# Patient Record
Sex: MALE | Race: OTHER | Employment: FULL TIME | URBAN - METROPOLITAN AREA
[De-identification: names, ages, dates, MRNs, and addresses within clinical notes are randomized per-mention and may not be internally consistent; named-entity substitution may affect disease eponyms.]

---

## 2017-10-14 ENCOUNTER — LAB ENCOUNTER (OUTPATIENT)
Dept: LAB | Facility: HOSPITAL | Age: 42
End: 2017-10-14
Payer: COMMERCIAL

## 2017-10-14 DIAGNOSIS — Z00.00 ROUTINE GENERAL MEDICAL EXAMINATION AT A HEALTH CARE FACILITY: Primary | ICD-10-CM

## 2017-10-14 PROCEDURE — 85025 COMPLETE CBC W/AUTO DIFF WBC: CPT

## 2017-10-14 PROCEDURE — 36415 COLL VENOUS BLD VENIPUNCTURE: CPT

## 2017-10-14 PROCEDURE — 87491 CHLMYD TRACH DNA AMP PROBE: CPT

## 2017-10-14 PROCEDURE — 80061 LIPID PANEL: CPT

## 2017-10-14 PROCEDURE — 80053 COMPREHEN METABOLIC PANEL: CPT

## 2017-10-14 PROCEDURE — 87591 N.GONORRHOEAE DNA AMP PROB: CPT

## 2017-10-14 PROCEDURE — 83036 HEMOGLOBIN GLYCOSYLATED A1C: CPT

## 2017-10-14 PROCEDURE — 84443 ASSAY THYROID STIM HORMONE: CPT

## 2018-02-02 ENCOUNTER — OFFICE VISIT (OUTPATIENT)
Dept: FAMILY MEDICINE CLINIC | Facility: CLINIC | Age: 43
End: 2018-02-02

## 2018-02-02 VITALS
SYSTOLIC BLOOD PRESSURE: 146 MMHG | HEART RATE: 76 BPM | HEIGHT: 66 IN | BODY MASS INDEX: 25.55 KG/M2 | DIASTOLIC BLOOD PRESSURE: 88 MMHG | WEIGHT: 159 LBS

## 2018-02-02 DIAGNOSIS — R06.00 DOE (DYSPNEA ON EXERTION): ICD-10-CM

## 2018-02-02 DIAGNOSIS — E11.9 DIABETES MELLITUS TYPE 2 IN NONOBESE (HCC): Primary | ICD-10-CM

## 2018-02-02 PROBLEM — R06.09 DOE (DYSPNEA ON EXERTION): Status: ACTIVE | Noted: 2018-02-02

## 2018-02-02 PROCEDURE — 90471 IMMUNIZATION ADMIN: CPT | Performed by: FAMILY MEDICINE

## 2018-02-02 PROCEDURE — 90472 IMMUNIZATION ADMIN EACH ADD: CPT | Performed by: FAMILY MEDICINE

## 2018-02-02 PROCEDURE — 90715 TDAP VACCINE 7 YRS/> IM: CPT | Performed by: FAMILY MEDICINE

## 2018-02-02 PROCEDURE — 99212 OFFICE O/P EST SF 10 MIN: CPT | Performed by: FAMILY MEDICINE

## 2018-02-02 PROCEDURE — 90670 PCV13 VACCINE IM: CPT | Performed by: FAMILY MEDICINE

## 2018-02-02 PROCEDURE — 99203 OFFICE O/P NEW LOW 30 MIN: CPT | Performed by: FAMILY MEDICINE

## 2018-02-02 RX ORDER — ATORVASTATIN CALCIUM 40 MG/1
40 TABLET, FILM COATED ORAL NIGHTLY
Qty: 30 TABLET | Refills: 4 | Status: SHIPPED | OUTPATIENT
Start: 2018-02-02 | End: 2019-10-10

## 2018-02-02 RX ORDER — LISINOPRIL 10 MG/1
10 TABLET ORAL DAILY
Qty: 30 TABLET | Refills: 3 | Status: SHIPPED | OUTPATIENT
Start: 2018-02-02 | End: 2018-08-13

## 2018-02-02 NOTE — PROGRESS NOTES
Blood pressure 146/88, pulse 76, height 5' 6\" (1.676 m), weight 159 lb (72.1 kg). Patient presents today for initial visit. Diagnosed with diabetes 4 months ago. No one ever talked him about cholesterol.   He reports he did have high cholesterol previ

## 2018-02-02 NOTE — PATIENT INSTRUCTIONS
En qué consiste la diabetes tipo 2  Cuando james organismo funciona de [place] normal, los alimentos que usted come se digieren y se utilizan jean-claude sagrario de energía para las células del cuerpo. Si usted tiene diabetes, la glucosa que se utiliza jean-claude sagrario d En la primera etapa de la diabetes tipo 2, las células no responden adecuadamente a la insulina. En consecuencia, la cantidad de glucosa que entra en las células es inferior a la normal. [place] se conoce jean-claude resistencia a la insulina.  En reacción a esto, el Para el cuidado de james diabetes, quizás usted acuda a james proveedor de atención médica primaria o a un especialista entre 2 y 4 veces al [facility]. Esta página contiene algunos de los exámenes y pruebas de rutina que se recomiendan para las personas con diabetes. · Exámenes de los ojos. Usted puede tener problemas en los ojos aunque no tenga dificultades con la visión.  Un oftalmólogo (especialista en ojos) o un optómetra especialmente capacitado le hará un examen de los ojos con la pupila dilatada, jean-claude mínimo Ernestene Crown

## 2018-02-14 ENCOUNTER — NURSE TRIAGE (OUTPATIENT)
Dept: OTHER | Age: 43
End: 2018-02-14

## 2018-02-14 NOTE — TELEPHONE ENCOUNTER
Informed pt of [facility]'s response that appt is needed. Pt only wants to see [facility] (refuses appt with other provider) but since no openings left today with [facility] pt requested appt for Friday but then could not make available appt with [facility].  I strongly advised sooner

## 2018-02-14 NOTE — TELEPHONE ENCOUNTER
Action Requested: Summary for Provider     []  Critical Lab, Recommendations Needed  [] Need Additional Advice  []   FYI    []   Need Orders  [x] Need Medications Sent to Pharmacy  []  Other     SUMMARY: bilateral eyes itchy, red, with a small amount of di

## 2018-02-16 ENCOUNTER — TELEPHONE (OUTPATIENT)
Dept: FAMILY MEDICINE CLINIC | Facility: CLINIC | Age: 43
End: 2018-02-16

## 2018-02-17 ENCOUNTER — LAB ENCOUNTER (OUTPATIENT)
Dept: LAB | Facility: HOSPITAL | Age: 43
End: 2018-02-17
Attending: FAMILY MEDICINE
Payer: COMMERCIAL

## 2018-02-17 DIAGNOSIS — E11.9 DIABETES MELLITUS (HCC): Primary | ICD-10-CM

## 2018-02-17 DIAGNOSIS — E11.9 DIABETES MELLITUS TYPE 2 IN NONOBESE (HCC): ICD-10-CM

## 2018-02-17 LAB
ALT SERPL-CCNC: 39 U/L (ref 17–63)
ANION GAP SERPL CALC-SCNC: 8 MMOL/L (ref 0–18)
AST SERPL-CCNC: 31 U/L (ref 15–41)
BACTERIA UR QL AUTO: NEGATIVE /HPF
BILIRUB UR QL: NEGATIVE
BUN SERPL-MCNC: 14 MG/DL (ref 8–20)
BUN/CREAT SERPL: 19.2 (ref 10–20)
CALCIUM SERPL-MCNC: 9.1 MG/DL (ref 8.5–10.5)
CHLORIDE SERPL-SCNC: 102 MMOL/L (ref 95–110)
CHOLEST SERPL-MCNC: 147 MG/DL (ref 110–200)
CLARITY UR: CLEAR
CO2 SERPL-SCNC: 29 MMOL/L (ref 22–32)
COLOR UR: YELLOW
CREAT SERPL-MCNC: 0.73 MG/DL (ref 0.5–1.5)
CREAT UR-MCNC: 198.4 MG/DL
GLUCOSE SERPL-MCNC: 163 MG/DL (ref 70–99)
GLUCOSE UR-MCNC: >=500 MG/DL
HDLC SERPL-MCNC: 31 MG/DL
HGB UR QL STRIP.AUTO: NEGATIVE
LDLC SERPL CALC-MCNC: 90 MG/DL (ref 0–99)
LEUKOCYTE ESTERASE UR QL STRIP.AUTO: NEGATIVE
MICROALBUMIN UR-MCNC: 0.6 MG/DL (ref 0–1.8)
MICROALBUMIN/CREAT UR: 3 MG/G{CREAT} (ref 0–20)
NITRITE UR QL STRIP.AUTO: NEGATIVE
NONHDLC SERPL-MCNC: 116 MG/DL
OSMOLALITY UR CALC.SUM OF ELEC: 292 MOSM/KG (ref 275–295)
PH UR: 6 [PH] (ref 5–8)
POTASSIUM SERPL-SCNC: 4.2 MMOL/L (ref 3.3–5.1)
PROT UR-MCNC: NEGATIVE MG/DL
RBC #/AREA URNS AUTO: 2 /HPF
SODIUM SERPL-SCNC: 139 MMOL/L (ref 136–144)
SP GR UR STRIP: 1.03 (ref 1–1.03)
TRIGL SERPL-MCNC: 131 MG/DL (ref 1–149)
UROBILINOGEN UR STRIP-ACNC: <2
VIT C UR-MCNC: NEGATIVE MG/DL
WBC #/AREA URNS AUTO: 1 /HPF

## 2018-02-17 PROCEDURE — 84460 ALANINE AMINO (ALT) (SGPT): CPT

## 2018-02-17 PROCEDURE — 82570 ASSAY OF URINE CREATININE: CPT

## 2018-02-17 PROCEDURE — 82043 UR ALBUMIN QUANTITATIVE: CPT

## 2018-02-17 PROCEDURE — 81001 URINALYSIS AUTO W/SCOPE: CPT

## 2018-02-17 PROCEDURE — 93005 ELECTROCARDIOGRAM TRACING: CPT

## 2018-02-17 PROCEDURE — 84450 TRANSFERASE (AST) (SGOT): CPT

## 2018-02-17 PROCEDURE — 80061 LIPID PANEL: CPT

## 2018-02-17 PROCEDURE — 80048 BASIC METABOLIC PNL TOTAL CA: CPT

## 2018-02-17 PROCEDURE — 36415 COLL VENOUS BLD VENIPUNCTURE: CPT

## 2018-02-17 PROCEDURE — 93010 ELECTROCARDIOGRAM REPORT: CPT | Performed by: FAMILY MEDICINE

## 2018-02-17 NOTE — TELEPHONE ENCOUNTER
LMTCB-Please transferred to ext [phone]. From: Jimmy Tavera  Sent: 2/16/2018   2:45 PM  To: Darrius Victoria, DO  Subject: Authorziation                                    Prior authorization obtained for stress echo.

## 2018-02-23 ENCOUNTER — OFFICE VISIT (OUTPATIENT)
Dept: FAMILY MEDICINE CLINIC | Facility: CLINIC | Age: 43
End: 2018-02-23

## 2018-02-23 VITALS
WEIGHT: 161.5 LBS | BODY MASS INDEX: 25.96 KG/M2 | HEART RATE: 78 BPM | HEIGHT: 66 IN | SYSTOLIC BLOOD PRESSURE: 120 MMHG | DIASTOLIC BLOOD PRESSURE: 76 MMHG

## 2018-02-23 DIAGNOSIS — Z00.00 ROUTINE PHYSICAL EXAMINATION: Primary | ICD-10-CM

## 2018-02-23 DIAGNOSIS — E11.9 DIABETES MELLITUS TYPE 2 IN NONOBESE (HCC): ICD-10-CM

## 2018-02-23 DIAGNOSIS — F41.0 PANIC ANXIETY SYNDROME: ICD-10-CM

## 2018-02-23 PROCEDURE — 99396 PREV VISIT EST AGE 40-64: CPT | Performed by: FAMILY MEDICINE

## 2018-02-23 RX ORDER — ESCITALOPRAM OXALATE 10 MG/1
10 TABLET ORAL DAILY
Qty: 30 TABLET | Refills: 2 | Status: SHIPPED | OUTPATIENT
Start: 2018-02-23 | End: 2019-08-27

## 2018-08-14 RX ORDER — LISINOPRIL 10 MG/1
TABLET ORAL
Qty: 90 TABLET | Refills: 3 | Status: SHIPPED | OUTPATIENT
Start: 2018-08-14 | End: 2019-08-21

## 2019-01-23 ENCOUNTER — TELEPHONE (OUTPATIENT)
Dept: FAMILY MEDICINE CLINIC | Facility: CLINIC | Age: 44
End: 2019-01-23

## 2019-01-23 DIAGNOSIS — E66.9 DIABETES MELLITUS TYPE 2 IN OBESE: Primary | ICD-10-CM

## 2019-01-23 DIAGNOSIS — E11.69 DIABETES MELLITUS TYPE 2 IN OBESE: Primary | ICD-10-CM

## 2019-01-23 NOTE — TELEPHONE ENCOUNTER
Pt would like to know if he can be seen this Saturday for his diabetic check it is hard for him to get off work during the week?

## 2019-01-26 ENCOUNTER — OFFICE VISIT (OUTPATIENT)
Dept: FAMILY MEDICINE CLINIC | Facility: CLINIC | Age: 44
End: 2019-01-26
Payer: COMMERCIAL

## 2019-01-26 ENCOUNTER — APPOINTMENT (OUTPATIENT)
Dept: LAB | Age: 44
End: 2019-01-26
Attending: FAMILY MEDICINE
Payer: COMMERCIAL

## 2019-01-26 ENCOUNTER — HOSPITAL ENCOUNTER (OUTPATIENT)
Dept: GENERAL RADIOLOGY | Age: 44
Discharge: HOME | End: 2019-01-26
Attending: FAMILY MEDICINE
Payer: COMMERCIAL

## 2019-01-26 VITALS
HEART RATE: 75 BPM | DIASTOLIC BLOOD PRESSURE: 77 MMHG | SYSTOLIC BLOOD PRESSURE: 124 MMHG | WEIGHT: 156 LBS | BODY MASS INDEX: 25.07 KG/M2 | HEIGHT: 66 IN

## 2019-01-26 DIAGNOSIS — F41.0 PANIC ANXIETY SYNDROME: ICD-10-CM

## 2019-01-26 DIAGNOSIS — M25.512 CHRONIC LEFT SHOULDER PAIN: ICD-10-CM

## 2019-01-26 DIAGNOSIS — G89.29 CHRONIC LEFT SHOULDER PAIN: ICD-10-CM

## 2019-01-26 DIAGNOSIS — E78.00 PURE HYPERCHOLESTEROLEMIA: ICD-10-CM

## 2019-01-26 DIAGNOSIS — E66.9 DIABETES MELLITUS TYPE 2 IN OBESE (HCC): ICD-10-CM

## 2019-01-26 DIAGNOSIS — E11.9 DIABETES MELLITUS TYPE 2 IN NONOBESE (HCC): Primary | ICD-10-CM

## 2019-01-26 DIAGNOSIS — E11.9 DIABETES MELLITUS TYPE 2 IN NONOBESE (HCC): ICD-10-CM

## 2019-01-26 DIAGNOSIS — E11.69 DIABETES MELLITUS TYPE 2 IN OBESE (HCC): ICD-10-CM

## 2019-01-26 LAB
ALT SERPL-CCNC: 55 U/L (ref 17–63)
ANION GAP SERPL CALC-SCNC: 11 MMOL/L (ref 0–18)
AST SERPL-CCNC: 38 U/L (ref 15–41)
BACTERIA UR QL AUTO: NEGATIVE /HPF
BILIRUB UR QL: NEGATIVE
BUN SERPL-MCNC: 13 MG/DL (ref 8–20)
BUN/CREAT SERPL: 20.6 (ref 10–20)
CALCIUM SERPL-MCNC: 9.5 MG/DL (ref 8.5–10.5)
CHLORIDE SERPL-SCNC: 99 MMOL/L (ref 95–110)
CHOLEST SERPL-MCNC: 198 MG/DL (ref 110–200)
CLARITY UR: CLEAR
CO2 SERPL-SCNC: 26 MMOL/L (ref 22–32)
COLOR UR: YELLOW
COMPLEXED PSA SERPL-MCNC: 0.33 NG/ML (ref 0.01–4)
CREAT SERPL-MCNC: 0.63 MG/DL (ref 0.5–1.5)
CREAT UR-MCNC: 121.9 MG/DL
EST. AVERAGE GLUCOSE BLD GHB EST-MCNC: 258 MG/DL (ref 68–126)
GLUCOSE SERPL-MCNC: 188 MG/DL (ref 70–99)
GLUCOSE UR-MCNC: >=500 MG/DL
HBA1C MFR BLD HPLC: 10.6 % (ref ?–5.7)
HDLC SERPL-MCNC: 43 MG/DL
HGB UR QL STRIP.AUTO: NEGATIVE
KETONES UR-MCNC: 20 MG/DL
LDLC SERPL CALC-MCNC: 134 MG/DL (ref 0–99)
LEUKOCYTE ESTERASE UR QL STRIP.AUTO: NEGATIVE
MICROALBUMIN UR-MCNC: 0.7 MG/DL (ref 0–1.8)
MICROALBUMIN/CREAT UR: 5.7 MG/G{CREAT} (ref 0–20)
NITRITE UR QL STRIP.AUTO: NEGATIVE
NONHDLC SERPL-MCNC: 155 MG/DL
OSMOLALITY UR CALC.SUM OF ELEC: 287 MOSM/KG (ref 275–295)
PH UR: 5 [PH] (ref 5–8)
POTASSIUM SERPL-SCNC: 4.2 MMOL/L (ref 3.3–5.1)
PROT UR-MCNC: NEGATIVE MG/DL
PSA SERPL-MCNC: 0.3 NG/ML (ref 0–4)
RBC #/AREA URNS AUTO: <1 /HPF
SODIUM SERPL-SCNC: 136 MMOL/L (ref 136–144)
SP GR UR STRIP: 1.04 (ref 1–1.03)
TRIGL SERPL-MCNC: 105 MG/DL (ref 1–149)
TSH SERPL-ACNC: 0.84 UIU/ML (ref 0.45–5.33)
UROBILINOGEN UR STRIP-ACNC: <2
VIT C UR-MCNC: NEGATIVE MG/DL
WBC #/AREA URNS AUTO: <1 /HPF

## 2019-01-26 PROCEDURE — 84443 ASSAY THYROID STIM HORMONE: CPT

## 2019-01-26 PROCEDURE — 82570 ASSAY OF URINE CREATININE: CPT

## 2019-01-26 PROCEDURE — 82043 UR ALBUMIN QUANTITATIVE: CPT

## 2019-01-26 PROCEDURE — 84460 ALANINE AMINO (ALT) (SGPT): CPT

## 2019-01-26 PROCEDURE — 80061 LIPID PANEL: CPT

## 2019-01-26 PROCEDURE — 73030 X-RAY EXAM OF SHOULDER: CPT | Performed by: FAMILY MEDICINE

## 2019-01-26 PROCEDURE — 99214 OFFICE O/P EST MOD 30 MIN: CPT | Performed by: FAMILY MEDICINE

## 2019-01-26 PROCEDURE — 36415 COLL VENOUS BLD VENIPUNCTURE: CPT

## 2019-01-26 PROCEDURE — 80048 BASIC METABOLIC PNL TOTAL CA: CPT

## 2019-01-26 PROCEDURE — 99212 OFFICE O/P EST SF 10 MIN: CPT | Performed by: FAMILY MEDICINE

## 2019-01-26 PROCEDURE — 83036 HEMOGLOBIN GLYCOSYLATED A1C: CPT

## 2019-01-26 PROCEDURE — 81001 URINALYSIS AUTO W/SCOPE: CPT

## 2019-01-26 PROCEDURE — 84450 TRANSFERASE (AST) (SGOT): CPT

## 2019-01-26 NOTE — PROGRESS NOTES
Blood pressure 124/77, pulse 75, height 5' 6\" (1.676 m), weight 156 lb (70.8 kg). Following up for diabetes and hyperlipidemia. Patient has had diabetes for more than 5 years. He denies chest pain or dyspnea. No polyuria or polydipsia.   He did have

## 2019-03-19 ENCOUNTER — APPOINTMENT (OUTPATIENT)
Dept: ENDOCRINOLOGY | Facility: HOSPITAL | Age: 44
End: 2019-03-19
Attending: FAMILY MEDICINE
Payer: COMMERCIAL

## 2019-08-02 ENCOUNTER — HOSPITAL ENCOUNTER (OUTPATIENT)
Dept: ULTRASOUND IMAGING | Age: 44
Discharge: HOME | End: 2019-08-02
Attending: FAMILY MEDICINE

## 2019-08-02 DIAGNOSIS — Z13.6 SCREENING FOR CARDIOVASCULAR CONDITION: ICD-10-CM

## 2019-08-02 NOTE — PROGRESS NOTES
Pt seen at [facility] Stroke Screening tests:  PRELIMINARY results as follows:    Carotid US=Right ICA; normal                      Left ICA; normal    Abdominal Aorta US= normal     XY=420/84    Cholestec labs as follows:  HI=931  HDL=36

## 2019-08-06 ENCOUNTER — TELEPHONE (OUTPATIENT)
Dept: FAMILY MEDICINE CLINIC | Facility: CLINIC | Age: 44
End: 2019-08-06

## 2019-08-06 NOTE — TELEPHONE ENCOUNTER
----- Message from Charlotte Emery DO sent at 8/5/2019  1:36 PM CDT -----  Results show fatty liver otherwise normal.  Exercise and weight loss.

## 2019-08-06 NOTE — TELEPHONE ENCOUNTER
Patient called back via Language Line  ID #[id number] (Name and  of pt verified). All results and recommendations reviewed. Patient verbalizes understanding, denies further questions and agrees with plan of care.

## 2019-08-22 RX ORDER — LISINOPRIL 10 MG/1
TABLET ORAL
Qty: 90 TABLET | Refills: 1 | Status: SHIPPED | OUTPATIENT
Start: 2019-08-22 | End: 2019-10-10

## 2019-08-22 NOTE — TELEPHONE ENCOUNTER
Hypertensive Medications  Protocol Criteria:  · Appointment scheduled in the past 6 months or in the next 3 months  · BMP or CMP in the past 12 months  · Creatinine result < 2  Recent Outpatient Visits            6 months ago Diabetes mellitus type 2 in no

## 2019-08-27 ENCOUNTER — OFFICE VISIT (OUTPATIENT)
Dept: FAMILY MEDICINE CLINIC | Facility: CLINIC | Age: 44
End: 2019-08-27
Payer: COMMERCIAL

## 2019-08-27 VITALS
WEIGHT: 162 LBS | DIASTOLIC BLOOD PRESSURE: 66 MMHG | HEIGHT: 66 IN | BODY MASS INDEX: 26.03 KG/M2 | SYSTOLIC BLOOD PRESSURE: 116 MMHG | HEART RATE: 80 BPM

## 2019-08-27 DIAGNOSIS — I10 ESSENTIAL HYPERTENSION: ICD-10-CM

## 2019-08-27 DIAGNOSIS — E11.9 DIABETES MELLITUS TYPE 2 IN NONOBESE (HCC): Primary | ICD-10-CM

## 2019-08-27 DIAGNOSIS — E78.00 PURE HYPERCHOLESTEROLEMIA: ICD-10-CM

## 2019-08-27 PROCEDURE — 99213 OFFICE O/P EST LOW 20 MIN: CPT | Performed by: FAMILY MEDICINE

## 2019-08-27 RX ORDER — GLIMEPIRIDE 4 MG/1
4 TABLET ORAL
Qty: 90 TABLET | Refills: 1 | Status: SHIPPED | OUTPATIENT
Start: 2019-08-27 | End: 2019-10-10 | Stop reason: ALTCHOICE

## 2019-08-27 NOTE — PROGRESS NOTES
Blood pressure 116/66, pulse 80, height 5' 6\" (1.676 m), weight 162 lb (73.5 kg). Presents today following up for diabetes. Has had diabetes for 10 years. Also with hypertension and hyperlipidemia.   No longer takes medication for anxiety and depressi

## 2019-09-27 ENCOUNTER — OFFICE VISIT (OUTPATIENT)
Dept: FAMILY MEDICINE CLINIC | Facility: CLINIC | Age: 44
End: 2019-09-27
Payer: COMMERCIAL

## 2019-09-27 ENCOUNTER — HOSPITAL ENCOUNTER (OUTPATIENT)
Dept: GENERAL RADIOLOGY | Age: 44
Discharge: HOME | End: 2019-09-27
Attending: FAMILY MEDICINE
Payer: COMMERCIAL

## 2019-09-27 VITALS
HEIGHT: 66 IN | BODY MASS INDEX: 26.49 KG/M2 | WEIGHT: 164.81 LBS | HEART RATE: 64 BPM | TEMPERATURE: 98 F | DIASTOLIC BLOOD PRESSURE: 66 MMHG | SYSTOLIC BLOOD PRESSURE: 141 MMHG

## 2019-09-27 DIAGNOSIS — E11.9 DIABETES MELLITUS TYPE 2 IN NONOBESE (HCC): ICD-10-CM

## 2019-09-27 DIAGNOSIS — M79.645 THUMB PAIN, LEFT: ICD-10-CM

## 2019-09-27 DIAGNOSIS — M79.645 THUMB PAIN, LEFT: Primary | ICD-10-CM

## 2019-09-27 DIAGNOSIS — S62.501A CLOSED NONDISPLACED FRACTURE OF PHALANX OF RIGHT THUMB, UNSPECIFIED PHALANX, INITIAL ENCOUNTER: ICD-10-CM

## 2019-09-27 PROCEDURE — 99213 OFFICE O/P EST LOW 20 MIN: CPT | Performed by: FAMILY MEDICINE

## 2019-09-27 PROCEDURE — 73140 X-RAY EXAM OF FINGER(S): CPT | Performed by: FAMILY MEDICINE

## 2019-09-27 RX ORDER — HYDROCODONE BITARTRATE AND ACETAMINOPHEN 5; 325 MG/1; MG/1
TABLET ORAL
Qty: 30 TABLET | Refills: 0 | Status: SHIPPED | OUTPATIENT
Start: 2019-09-27 | End: 2019-10-10 | Stop reason: ALTCHOICE

## 2019-09-27 NOTE — PROGRESS NOTES
Blood pressure 141/66, pulse 64, temperature 97.7 °F (36.5 °C), temperature source Oral, height 5' 6\" (1.676 m), weight 164 lb 12.8 oz (74.8 kg). Patient presents today complaining of left thumb pain after eating with a hammer yesterday.   He did not se

## 2019-09-30 ENCOUNTER — APPOINTMENT (OUTPATIENT)
Dept: LAB | Age: 44
End: 2019-09-30
Attending: FAMILY MEDICINE
Payer: COMMERCIAL

## 2019-09-30 ENCOUNTER — OFFICE VISIT (OUTPATIENT)
Dept: SURGERY | Facility: CLINIC | Age: 44
End: 2019-09-30
Payer: COMMERCIAL

## 2019-09-30 DIAGNOSIS — S62.525A CLOSED NONDISPLACED FRACTURE OF DISTAL PHALANX OF LEFT THUMB, INITIAL ENCOUNTER: Primary | ICD-10-CM

## 2019-09-30 DIAGNOSIS — E11.9 DIABETES MELLITUS TYPE 2 IN NONOBESE (HCC): ICD-10-CM

## 2019-09-30 LAB
ALBUMIN SERPL-MCNC: 4.2 G/DL (ref 3.4–5)
ALBUMIN/GLOB SERPL: 1.2 {RATIO} (ref 1–2)
ALP LIVER SERPL-CCNC: 75 U/L (ref 45–117)
ALT SERPL-CCNC: 55 U/L (ref 16–61)
ANION GAP SERPL CALC-SCNC: 5 MMOL/L (ref 0–18)
AST SERPL-CCNC: 29 U/L (ref 15–37)
BILIRUB SERPL-MCNC: 0.4 MG/DL (ref 0.1–2)
BUN BLD-MCNC: 17 MG/DL (ref 7–18)
BUN/CREAT SERPL: 21 (ref 10–20)
CALCIUM BLD-MCNC: 9.2 MG/DL (ref 8.5–10.1)
CHLORIDE SERPL-SCNC: 106 MMOL/L (ref 98–112)
CO2 SERPL-SCNC: 28 MMOL/L (ref 21–32)
CREAT BLD-MCNC: 0.81 MG/DL (ref 0.7–1.3)
GLOBULIN PLAS-MCNC: 3.4 G/DL (ref 2.8–4.4)
GLUCOSE BLD-MCNC: 132 MG/DL (ref 70–99)
M PROTEIN MFR SERPL ELPH: 7.6 G/DL (ref 6.4–8.2)
OSMOLALITY SERPL CALC.SUM OF ELEC: 291 MOSM/KG (ref 275–295)
PATIENT FASTING: YES
POTASSIUM SERPL-SCNC: 4.7 MMOL/L (ref 3.5–5.1)
SODIUM SERPL-SCNC: 139 MMOL/L (ref 136–145)

## 2019-09-30 PROCEDURE — 36415 COLL VENOUS BLD VENIPUNCTURE: CPT | Performed by: FAMILY MEDICINE

## 2019-09-30 PROCEDURE — 99243 OFF/OP CNSLTJ NEW/EST LOW 30: CPT | Performed by: PLASTIC SURGERY

## 2019-09-30 PROCEDURE — 80061 LIPID PANEL: CPT

## 2019-09-30 PROCEDURE — 29130 APPL FINGER SPLINT STATIC: CPT | Performed by: OCCUPATIONAL THERAPIST

## 2019-09-30 PROCEDURE — 83036 HEMOGLOBIN GLYCOSYLATED A1C: CPT

## 2019-09-30 PROCEDURE — 80053 COMPREHEN METABOLIC PANEL: CPT | Performed by: FAMILY MEDICINE

## 2019-09-30 NOTE — H&P
Injury 1: Communited undisplaced fracture of tuft of distal phalanx of left thumb  - Date: 09/26/19  - Days Since: 4    Lacy Andre is a 53year old male that presents with Patient presents with:  Fracture: Distal Phalanx of left thumb  .     Dave Cain MG Oral Tab Take 1 tablet (4 mg total) by mouth every morning before breakfast. For diabetes Disp: 90 tablet Rfl: 1   LISINOPRIL 10 MG Oral Tab TAKE 1 TABLET(10 MG) BY MOUTH DAILY Disp: 90 tablet Rfl: 1   atorvastatin 40 MG Oral Tab Take 1 tablet (40 mg to Asked        Past Sunlamp Treatments for Acne: Not Asked        History of tanning: Not Asked        Hx of Spending [place] of Time in Sun: Not Asked        Bad sunburns in the past: Not Asked        Tanning Salons in the Past: Not Asked        Hx of Ra

## 2019-09-30 NOTE — PROGRESS NOTES
Subjective: I smashed my thumb in a machine. Objective:     Current level of performance:  ADL: Independent  Work: One handed work   Leisure: Exercise.     Measurements/Tests:  ROM:         N/A:          Treatment Provided this day: Fabricated a left t

## 2019-10-10 ENCOUNTER — OFFICE VISIT (OUTPATIENT)
Dept: FAMILY MEDICINE CLINIC | Facility: CLINIC | Age: 44
End: 2019-10-10
Payer: COMMERCIAL

## 2019-10-10 VITALS
SYSTOLIC BLOOD PRESSURE: 132 MMHG | HEIGHT: 66 IN | RESPIRATION RATE: 19 BRPM | DIASTOLIC BLOOD PRESSURE: 79 MMHG | HEART RATE: 66 BPM | WEIGHT: 160.38 LBS | BODY MASS INDEX: 25.78 KG/M2

## 2019-10-10 DIAGNOSIS — E11.9 TYPE 2 DIABETES MELLITUS WITHOUT COMPLICATION, WITHOUT LONG-TERM CURRENT USE OF INSULIN (HCC): Primary | ICD-10-CM

## 2019-10-10 DIAGNOSIS — E11.9 DIABETES MELLITUS WITHOUT COMPLICATION (HCC): ICD-10-CM

## 2019-10-10 DIAGNOSIS — E78.2 MIXED HYPERLIPIDEMIA: ICD-10-CM

## 2019-10-10 PROCEDURE — 90686 IIV4 VACC NO PRSV 0.5 ML IM: CPT | Performed by: PHYSICIAN ASSISTANT

## 2019-10-10 PROCEDURE — 90471 IMMUNIZATION ADMIN: CPT | Performed by: PHYSICIAN ASSISTANT

## 2019-10-10 PROCEDURE — 99214 OFFICE O/P EST MOD 30 MIN: CPT | Performed by: PHYSICIAN ASSISTANT

## 2019-10-10 RX ORDER — ATORVASTATIN CALCIUM 40 MG/1
20 TABLET, FILM COATED ORAL NIGHTLY
Qty: 90 TABLET | Refills: 3 | Status: SHIPPED | OUTPATIENT
Start: 2019-10-10 | End: 2020-06-12

## 2019-10-10 RX ORDER — ATORVASTATIN CALCIUM 40 MG/1
40 TABLET, FILM COATED ORAL NIGHTLY
Qty: 90 TABLET | Refills: 1 | Status: CANCELLED | OUTPATIENT
Start: 2019-10-10

## 2019-10-10 RX ORDER — LISINOPRIL 10 MG/1
TABLET ORAL
Qty: 90 TABLET | Refills: 3 | Status: SHIPPED | OUTPATIENT
Start: 2019-10-10 | End: 2020-06-12

## 2019-10-10 RX ORDER — GLIMEPIRIDE 4 MG/1
4 TABLET ORAL
Qty: 90 TABLET | Refills: 1 | Status: CANCELLED | OUTPATIENT
Start: 2019-10-10

## 2019-10-10 NOTE — PROGRESS NOTES
HPI:   Diabetes   He presents for his initial diabetic visit. He has type 2 diabetes mellitus. The initial diagnosis of diabetes was made 8 years ago. His disease course has been fluctuating.  Pertinent negatives for hypoglycemia include no dizziness or hea pertinent surgical history. Family History:   History reviewed. No pertinent family history.     Social History:   Social History    Socioeconomic History      Marital status: Single      Spouse name: Not on file      Number of children: Not on file Not on file      Review of Systems:   Review of Systems   Constitutional: Negative for activity change, chills, fatigue and fever. HENT: Negative for congestion, ear discharge, ear pain, postnasal drip, rhinorrhea, sinus pressure and sore throat.     Ey Advise patient to take medication as directed. Patient verbalized understanding.            Relevant Medications    atorvastatin 40 MG Oral Tab       Endocrine    Type 2 diabetes mellitus without complication, without long-term current use of insulin ([address] plan of care with pt and pt is in agreement. All questions answered. Pt to call with questions or concerns. Encouraged to sign up for My Chart if not already registered.

## 2019-10-11 PROBLEM — Z00.00 ROUTINE PHYSICAL EXAMINATION: Status: RESOLVED | Noted: 2018-02-23 | Resolved: 2019-10-11

## 2019-10-11 PROBLEM — E11.9 TYPE 2 DIABETES MELLITUS WITHOUT COMPLICATION, WITHOUT LONG-TERM CURRENT USE OF INSULIN (HCC): Status: ACTIVE | Noted: 2019-10-11

## 2019-10-11 PROBLEM — E78.2 MIXED HYPERLIPIDEMIA: Status: ACTIVE | Noted: 2019-10-11

## 2019-10-11 NOTE — ASSESSMENT & PLAN NOTE
Discussed lab results with patient. Continue with Metformin 1000 mg PO BID. Discontinue Glimepiride 4 mg PO QAM. Start Jardiance 10 mg PO QAM. Instruct patient to monitor BG pre and post meals. Goals  1. Preprandial glucose targets  mg/dL.   2. Systo

## 2019-10-11 NOTE — ASSESSMENT & PLAN NOTE
Resume Lipitor 20 mg PO QHS. Advise patient to take medication as directed. Patient verbalized understanding.

## 2019-10-14 ENCOUNTER — OFFICE VISIT (OUTPATIENT)
Dept: SURGERY | Facility: CLINIC | Age: 44
End: 2019-10-14
Payer: COMMERCIAL

## 2019-10-14 DIAGNOSIS — M62.81 DISTAL MUSCLE WEAKNESS: ICD-10-CM

## 2019-10-14 DIAGNOSIS — M25.642 STIFFNESS OF JOINT, HAND, LEFT: Primary | ICD-10-CM

## 2019-10-14 DIAGNOSIS — S62.525A CLOSED NONDISPLACED FRACTURE OF DISTAL PHALANX OF LEFT THUMB, INITIAL ENCOUNTER: Primary | ICD-10-CM

## 2019-10-14 PROCEDURE — 97110 THERAPEUTIC EXERCISES: CPT | Performed by: OCCUPATIONAL THERAPIST

## 2019-10-14 PROCEDURE — 99212 OFFICE O/P EST SF 10 MIN: CPT | Performed by: PLASTIC SURGERY

## 2019-10-14 PROCEDURE — 29130 APPL FINGER SPLINT STATIC: CPT | Performed by: OCCUPATIONAL THERAPIST

## 2019-10-14 PROCEDURE — 97166 OT EVAL MOD COMPLEX 45 MIN: CPT | Performed by: OCCUPATIONAL THERAPIST

## 2019-10-14 NOTE — PROGRESS NOTES
OCCUPATIONAL THERAPY EVALUATION:   Xu Lobo   [id number]       SUBJECTIVE:    HX of Injury: I hurt my left thumb at work. Chief Complaint:   Minimal discomfort. .    Precautions: # 2 pound lifting restriction with the left hand.   Premorbid Functi .    Increased PIP AROM  X 15 - 20 degrees. .    Long term goals to be reached in x 3-4 weeks:    1) Full functional use of the involved extremity for self-care, leisure and work related tasks:  .         Patient will be seen 2 x /week for 3 weeks or a tot

## 2019-10-14 NOTE — PROGRESS NOTES
Injury 1: L TH DP comminuted,nondisplaced fracture  - Date: 09/26/19  - Days Since: 18  Arrived with no splint, states \"took it off after shower this am but otherwise had been wearing it all the time\"  Intermittent dull pain with activity and to touch di

## 2019-10-16 ENCOUNTER — TELEPHONE (OUTPATIENT)
Dept: SURGERY | Facility: CLINIC | Age: 44
End: 2019-10-16

## 2019-10-16 NOTE — TELEPHONE ENCOUNTER
Spoke to [employer], workman's compensation  for ptAdela Real wanted recent work status form to be faxed over to [phone] and all notes from Dr. Millicent Cárdenas.    Addie informed release of information will help obtain all records for notes of Dr. Malena Flores

## 2019-10-24 ENCOUNTER — OFFICE VISIT (OUTPATIENT)
Dept: SURGERY | Facility: CLINIC | Age: 44
End: 2019-10-24
Payer: COMMERCIAL

## 2019-10-24 DIAGNOSIS — M62.81 DISTAL MUSCLE WEAKNESS: ICD-10-CM

## 2019-10-24 DIAGNOSIS — M25.642 STIFFNESS OF JOINT, HAND, LEFT: Primary | ICD-10-CM

## 2019-10-24 PROCEDURE — 97110 THERAPEUTIC EXERCISES: CPT | Performed by: OCCUPATIONAL THERAPIST

## 2019-10-24 NOTE — PROGRESS NOTES
Subjective:  My thumb does not hurt      Objective:     Current level of performance:  ADL: Independent  Work: Restricted 2 # lifting limitation with the left hand.   Leisure: Not addressed    Measurements/Tests:  ROM:            N/A      Treatment Provided

## 2019-10-31 ENCOUNTER — OFFICE VISIT (OUTPATIENT)
Dept: SURGERY | Facility: CLINIC | Age: 44
End: 2019-10-31
Payer: COMMERCIAL

## 2019-10-31 DIAGNOSIS — M62.81 DISTAL MUSCLE WEAKNESS: ICD-10-CM

## 2019-10-31 DIAGNOSIS — S62.525A CLOSED NONDISPLACED FRACTURE OF DISTAL PHALANX OF LEFT THUMB, INITIAL ENCOUNTER: Primary | ICD-10-CM

## 2019-10-31 DIAGNOSIS — M25.642 STIFFNESS OF JOINT, HAND, LEFT: Primary | ICD-10-CM

## 2019-10-31 PROCEDURE — 97110 THERAPEUTIC EXERCISES: CPT | Performed by: OCCUPATIONAL THERAPIST

## 2019-10-31 PROCEDURE — 99212 OFFICE O/P EST SF 10 MIN: CPT | Performed by: PLASTIC SURGERY

## 2019-10-31 NOTE — PROGRESS NOTES
Subjective:  I am ready to work. Objective:     Current level of performance:  ADL: Independent  Work: Returning to full duty.   Leisure: Not addressed    Measurements/Tests:  ROM:  Testing By: mary   Strength Right: 105 #      Strength Left: 10

## 2019-10-31 NOTE — PROGRESS NOTES
Injury 1: L TH DP comminuted,nondisplaced fracture  - Date: 09/26/19  - Days Since: 35      Pt here for FU of L TH DP fx   States he's doing \"good\"   C/o \"pulsing\" pain when pressure is applied to IP L TH   Denies numbness and tingling   L TH nail plat

## 2019-11-10 NOTE — TELEPHONE ENCOUNTER
Duplicate request, previously addressed.     Requested Prescriptions   Pending Prescriptions Disp Refills   • METFORMIN  MG Oral Tab [Pharmacy Med Name: METFORMIN 500MG TABLETS] 180 tablet 0     Sig: TAKE 1 TABLET(500 MG) BY MOUTH TWICE DAILY WITH ME

## 2020-05-15 ENCOUNTER — NURSE TRIAGE (OUTPATIENT)
Dept: FAMILY MEDICINE CLINIC | Facility: CLINIC | Age: 45
End: 2020-05-15

## 2020-05-15 NOTE — PROGRESS NOTES
Virtual Telephone Check-In    Giuseppe Banks verbally consents to a Virtual/Telephone Check-In visit on 05/15/20. Patient understands and accepts financial responsibility for any deductible, co-insurance and/or co-pays associated with this service.

## 2020-05-15 NOTE — TELEPHONE ENCOUNTER
Action Requested: Summary for Provider     []  Critical Lab, Recommendations Needed  [] Need Additional Advice  []   FYI    []   Need Orders  [] Need Medications Sent to Pharmacy  []  Other     SUMMARY: pt lives with brother in law who tested positive on M

## 2020-06-11 NOTE — TELEPHONE ENCOUNTER
Pt would like a refill on his metformin and lisinopril medication. Per pt he is out of medication.  Pharmacy: [facility] (listed)     Current Outpatient Medications   Medication Sig Dispense Refill   • lisinopril 10 MG Oral Tab TAKE 1 TABLET(10 MG)

## 2020-06-12 RX ORDER — LISINOPRIL 10 MG/1
TABLET ORAL
Qty: 30 TABLET | Refills: 0 | Status: SHIPPED | OUTPATIENT
Start: 2020-06-12 | End: 2021-01-30

## 2020-06-12 RX ORDER — ATORVASTATIN CALCIUM 40 MG/1
20 TABLET, FILM COATED ORAL NIGHTLY
Qty: 30 TABLET | Refills: 0 | Status: SHIPPED | OUTPATIENT
Start: 2020-06-12 | End: 2021-01-30

## 2020-06-12 NOTE — TELEPHONE ENCOUNTER
Min pt has made a appt to see you on June 24 would you be able to refill his medications? Pt is going out of town today. I have pended the medications for your review and approval. The Stevan Dave was in his history list but pt stated that he was taking it bu

## 2020-06-12 NOTE — TELEPHONE ENCOUNTER
Spoke with patient, provided lab phone number, he will call back and schedule OV, Please schedule a 30 min diabetic f/u with Beau OLMOS in [facility], thank you

## 2020-11-04 DIAGNOSIS — E11.69 DIABETES MELLITUS TYPE 2 IN OBESE (HCC): Primary | ICD-10-CM

## 2020-11-04 DIAGNOSIS — E66.9 DIABETES MELLITUS TYPE 2 IN OBESE (HCC): Primary | ICD-10-CM

## 2020-11-04 RX ORDER — EMPAGLIFLOZIN 10 MG/1
1 TABLET, FILM COATED ORAL DAILY
Qty: 90 TABLET | Refills: 0 | Status: SHIPPED | OUTPATIENT
Start: 2020-11-04 | End: 2021-01-30

## 2020-11-11 NOTE — TELEPHONE ENCOUNTER
Patient will call to schedule an appointment for blood work and call back to schedule an appointment with Dr. Sebastian Gonzalez. Patient also mentioned he was diagnosed covid positive about 15 days. Patient was tested outside of [place] due to exposure.  Pj

## 2021-01-30 ENCOUNTER — OFFICE VISIT (OUTPATIENT)
Dept: FAMILY MEDICINE CLINIC | Facility: CLINIC | Age: 46
End: 2021-01-30
Payer: COMMERCIAL

## 2021-01-30 VITALS
WEIGHT: 150 LBS | HEART RATE: 73 BPM | SYSTOLIC BLOOD PRESSURE: 145 MMHG | BODY MASS INDEX: 24.11 KG/M2 | HEIGHT: 66 IN | DIASTOLIC BLOOD PRESSURE: 87 MMHG

## 2021-01-30 DIAGNOSIS — Z00.00 ROUTINE GENERAL MEDICAL EXAMINATION AT A HEALTH CARE FACILITY: Primary | ICD-10-CM

## 2021-01-30 DIAGNOSIS — E11.9 TYPE 2 DIABETES MELLITUS WITHOUT COMPLICATION, WITHOUT LONG-TERM CURRENT USE OF INSULIN (HCC): ICD-10-CM

## 2021-01-30 DIAGNOSIS — E78.2 MIXED HYPERLIPIDEMIA: ICD-10-CM

## 2021-01-30 DIAGNOSIS — F41.0 PANIC ANXIETY SYNDROME: ICD-10-CM

## 2021-01-30 DIAGNOSIS — E55.9 VITAMIN D DEFICIENCY: ICD-10-CM

## 2021-01-30 DIAGNOSIS — Z12.5 SCREENING FOR MALIGNANT NEOPLASM OF PROSTATE: ICD-10-CM

## 2021-01-30 DIAGNOSIS — N48.1 BALANITIS: ICD-10-CM

## 2021-01-30 DIAGNOSIS — I10 ESSENTIAL HYPERTENSION: ICD-10-CM

## 2021-01-30 PROCEDURE — 90686 IIV4 VACC NO PRSV 0.5 ML IM: CPT | Performed by: PHYSICIAN ASSISTANT

## 2021-01-30 PROCEDURE — 90471 IMMUNIZATION ADMIN: CPT | Performed by: PHYSICIAN ASSISTANT

## 2021-01-30 PROCEDURE — 99396 PREV VISIT EST AGE 40-64: CPT | Performed by: PHYSICIAN ASSISTANT

## 2021-01-30 PROCEDURE — 3008F BODY MASS INDEX DOCD: CPT | Performed by: PHYSICIAN ASSISTANT

## 2021-01-30 PROCEDURE — 3079F DIAST BP 80-89 MM HG: CPT | Performed by: PHYSICIAN ASSISTANT

## 2021-01-30 PROCEDURE — 90732 PPSV23 VACC 2 YRS+ SUBQ/IM: CPT | Performed by: PHYSICIAN ASSISTANT

## 2021-01-30 PROCEDURE — 90472 IMMUNIZATION ADMIN EACH ADD: CPT | Performed by: PHYSICIAN ASSISTANT

## 2021-01-30 PROCEDURE — 3077F SYST BP >= 140 MM HG: CPT | Performed by: PHYSICIAN ASSISTANT

## 2021-01-30 RX ORDER — ATORVASTATIN CALCIUM 20 MG/1
20 TABLET, FILM COATED ORAL NIGHTLY
Qty: 90 TABLET | Refills: 1 | Status: SHIPPED | OUTPATIENT
Start: 2021-01-30 | End: 2021-02-13 | Stop reason: DRUGHIGH

## 2021-01-30 RX ORDER — LISINOPRIL 10 MG/1
TABLET ORAL
Qty: 90 TABLET | Refills: 1 | Status: SHIPPED | OUTPATIENT
Start: 2021-01-30 | End: 2021-07-30

## 2021-01-30 RX ORDER — FLUCONAZOLE 150 MG/1
TABLET ORAL
Qty: 2 TABLET | Refills: 0 | Status: SHIPPED | OUTPATIENT
Start: 2021-01-30 | End: 2021-02-13 | Stop reason: ALTCHOICE

## 2021-01-30 RX ORDER — ESCITALOPRAM OXALATE 10 MG/1
10 TABLET ORAL DAILY
Qty: 90 TABLET | Refills: 1 | Status: SHIPPED | OUTPATIENT
Start: 2021-01-30 | End: 2021-08-17 | Stop reason: ALTCHOICE

## 2021-01-30 RX ORDER — CLOTRIMAZOLE 1 %
1 CREAM (GRAM) TOPICAL 2 TIMES DAILY
Qty: 28 G | Refills: 0 | Status: SHIPPED | OUTPATIENT
Start: 2021-01-30 | End: 2021-02-13 | Stop reason: ALTCHOICE

## 2021-01-30 RX ORDER — EMPAGLIFLOZIN 10 MG/1
1 TABLET, FILM COATED ORAL DAILY
Qty: 90 TABLET | Refills: 1 | Status: SHIPPED | OUTPATIENT
Start: 2021-01-30 | End: 2021-02-02

## 2021-01-30 NOTE — PATIENT INSTRUCTIONS
Pautas de prevención para hombres de 36 a 52 años de [address] [facility] de detección y las vacunas son importantes para el manejo de james esther.  La consejería sobre james esther también es esencial. A continuación, verá pautas en relación con esos temas para ho Sífilis Los hombres con mayor riesgo de infección; hable con james proveedor de [employer] médica En los exámenes de rutina   Tuberculosis Los hombres con mayor riesgo de infección; hable con james proveedor de atención médica Consulte a james proveedor de atención m PCV13: rolando dosis [facility] 19 y 72 años de edad (protege contra 13 tipos de bacteria neumocócica)    PPSV23: Kenan Keep a dos dosis [employer] 59 años de John, o rolando dosis a partir de los 65 años (protege contra 23 tipos de bacteria neumocócica)   Refuerzo de téta

## 2021-01-30 NOTE — PROGRESS NOTES
HPI:   Nena Mccabe is a 50year old male who presents for an Annual Health Visit. Patient complaints of penile redness and itching and whitish discharge since he takes Jardiance.    Patient denies of chest pain, SOB, N/V/C/D, fever, dizziness, /87   Pulse 73   Ht 5' 6\" (1.676 m)   Wt 150 lb (68 kg)   BMI 24.21 kg/m²    Wt Readings from Last 6 Encounters:  01/30/21 : 150 lb (68 kg)  10/10/19 : 160 lb 6.4 oz (72.8 kg)  09/27/19 : 164 lb 12.8 oz (74.8 kg)  08/27/19 : 162 lb (73.5 kg)  01/26/ -     metFORMIN HCl 1000 MG Oral Tab; TAKE ONE TABLET BY MOUTH 2 TIMES DAILY WIT MEALS  -     Empagliflozin (JARDIANCE) 10 MG Oral Tab; Take 1 tablet by mouth daily. -     fluconazole (DIFLUCAN) 150 MG Oral Tab;  Take 1 tablet PO today, then repeat in 3 da Diabetes tipo 2 o prediabetes [employer] a la edad de Dam[place], y los adultos que no tengan síntomas a ninguna edad, que tengan sobrepeso o que carrie obesos y que tengan 1 o más factores de riesgo para la diabetes Al menos cada 3 años (anual Hepatitis B Los hombres con mayor riesgo de infección; hable con james proveedor de [employer] dosis a lo bubba de seis meses; la segunda dosis debería aplicarse un mes después de la primera dosis; la tercera dosis debería aplicarse al [place] Uso diario de [facility] [place] 39 y 78 años de edad con riesgo de tener problemas cardiovasculares En los exámenes de rutina   Uso del tabaco y los efectos que puede tener sobre la esther [employer] hombres de supriya nura de [employer] Luci Chapa

## 2021-01-31 DIAGNOSIS — I10 ESSENTIAL HYPERTENSION: ICD-10-CM

## 2021-01-31 DIAGNOSIS — E11.9 TYPE 2 DIABETES MELLITUS WITHOUT COMPLICATION, WITHOUT LONG-TERM CURRENT USE OF INSULIN (HCC): ICD-10-CM

## 2021-02-01 RX ORDER — LISINOPRIL 10 MG/1
TABLET ORAL
Qty: 90 TABLET | Refills: 1 | OUTPATIENT
Start: 2021-02-01

## 2021-02-01 RX ORDER — ATORVASTATIN CALCIUM 40 MG/1
TABLET, FILM COATED ORAL
Qty: 90 TABLET | Refills: 3 | OUTPATIENT
Start: 2021-02-01

## 2021-02-01 NOTE — TELEPHONE ENCOUNTER
Current Outpatient Medications   Medication Sig Dispense Refill   • Empagliflozin (JARDIANCE) 10 MG Oral Tab Take 1 tablet by mouth daily.  90 tablet 1

## 2021-02-02 RX ORDER — EMPAGLIFLOZIN 10 MG/1
1 TABLET, FILM COATED ORAL DAILY
Qty: 90 TABLET | Refills: 1 | Status: SHIPPED | OUTPATIENT
Start: 2021-02-02 | End: 2021-02-13 | Stop reason: DRUGHIGH

## 2021-02-13 ENCOUNTER — OFFICE VISIT (OUTPATIENT)
Dept: FAMILY MEDICINE CLINIC | Facility: CLINIC | Age: 46
End: 2021-02-13
Payer: COMMERCIAL

## 2021-02-13 VITALS
SYSTOLIC BLOOD PRESSURE: 134 MMHG | HEART RATE: 74 BPM | DIASTOLIC BLOOD PRESSURE: 82 MMHG | BODY MASS INDEX: 24.27 KG/M2 | WEIGHT: 151 LBS | HEIGHT: 66 IN

## 2021-02-13 DIAGNOSIS — I10 ESSENTIAL HYPERTENSION: ICD-10-CM

## 2021-02-13 DIAGNOSIS — E11.9 TYPE 2 DIABETES MELLITUS WITHOUT COMPLICATION, WITHOUT LONG-TERM CURRENT USE OF INSULIN (HCC): ICD-10-CM

## 2021-02-13 DIAGNOSIS — E78.2 MIXED HYPERLIPIDEMIA: Primary | ICD-10-CM

## 2021-02-13 PROCEDURE — 99213 OFFICE O/P EST LOW 20 MIN: CPT | Performed by: PHYSICIAN ASSISTANT

## 2021-02-13 PROCEDURE — 3008F BODY MASS INDEX DOCD: CPT | Performed by: PHYSICIAN ASSISTANT

## 2021-02-13 PROCEDURE — 3079F DIAST BP 80-89 MM HG: CPT | Performed by: PHYSICIAN ASSISTANT

## 2021-02-13 PROCEDURE — 3075F SYST BP GE 130 - 139MM HG: CPT | Performed by: PHYSICIAN ASSISTANT

## 2021-02-13 RX ORDER — ERGOCALCIFEROL 1.25 MG/1
50000 CAPSULE ORAL
Qty: 12 CAPSULE | Refills: 3 | Status: SHIPPED | OUTPATIENT
Start: 2021-02-13 | End: 2021-05-14

## 2021-02-13 RX ORDER — ATORVASTATIN CALCIUM 40 MG/1
40 TABLET, FILM COATED ORAL NIGHTLY
Qty: 90 TABLET | Refills: 3 | Status: SHIPPED | OUTPATIENT
Start: 2021-02-13 | End: 2021-05-14

## 2021-02-13 RX ORDER — EMPAGLIFLOZIN 25 MG/1
25 TABLET, FILM COATED ORAL EVERY MORNING
Qty: 90 TABLET | Refills: 1 | Status: SHIPPED | OUTPATIENT
Start: 2021-02-13 | End: 2021-03-15

## 2021-02-13 NOTE — PROGRESS NOTES
HPI:   HPI  50year-old male is here in the office for lab results. Patient is doing fine at this time. Patient monitors BG 2 times per week. Patient denies of chest pain, SOB, N/V/C/D, fever, dizziness, syncope, abdominal pain.  There are no other concern Medical: Not on file        Non-medical: Not on file    Tobacco Use      Smoking status: Never Smoker      Smokeless tobacco: Never Used    Substance and Sexual Activity      Alcohol use: No      Drug use: No      Sexual activity: Not on file    Lifestyle Negative for rash. Neurological: Negative for dizziness, weakness, numbness and headaches. 02/13/21  0833   BP: 134/82   Pulse: 74   Weight: 151 lb (68.5 kg)   Height: 5' 6\" (1.676 m)     Body mass index is 24.37 kg/m².     Physical Exam:   Physic plan of care with pt and pt is in agreement. All questions answered. Pt to call with questions or concerns.

## 2021-03-29 ENCOUNTER — TELEPHONE (OUTPATIENT)
Dept: FAMILY MEDICINE CLINIC | Facility: CLINIC | Age: 46
End: 2021-03-29

## 2021-07-30 DIAGNOSIS — E11.9 TYPE 2 DIABETES MELLITUS WITHOUT COMPLICATION, WITHOUT LONG-TERM CURRENT USE OF INSULIN (HCC): ICD-10-CM

## 2021-07-30 DIAGNOSIS — I10 ESSENTIAL HYPERTENSION: ICD-10-CM

## 2021-07-30 RX ORDER — LISINOPRIL 10 MG/1
TABLET ORAL
Qty: 30 TABLET | Refills: 0 | Status: SHIPPED | OUTPATIENT
Start: 2021-07-30 | End: 2021-08-17

## 2021-08-17 ENCOUNTER — TELEPHONE (OUTPATIENT)
Dept: FAMILY MEDICINE CLINIC | Facility: CLINIC | Age: 46
End: 2021-08-17

## 2021-08-17 ENCOUNTER — OFFICE VISIT (OUTPATIENT)
Dept: FAMILY MEDICINE CLINIC | Facility: CLINIC | Age: 46
End: 2021-08-17
Payer: COMMERCIAL

## 2021-08-17 VITALS
SYSTOLIC BLOOD PRESSURE: 133 MMHG | HEIGHT: 66 IN | HEART RATE: 89 BPM | DIASTOLIC BLOOD PRESSURE: 74 MMHG | WEIGHT: 149 LBS | BODY MASS INDEX: 23.95 KG/M2

## 2021-08-17 DIAGNOSIS — I10 ESSENTIAL HYPERTENSION: ICD-10-CM

## 2021-08-17 DIAGNOSIS — E11.9 TYPE 2 DIABETES MELLITUS WITHOUT COMPLICATION, WITHOUT LONG-TERM CURRENT USE OF INSULIN (HCC): ICD-10-CM

## 2021-08-17 DIAGNOSIS — E78.2 MIXED HYPERLIPIDEMIA: Primary | ICD-10-CM

## 2021-08-17 PROCEDURE — 3008F BODY MASS INDEX DOCD: CPT | Performed by: PHYSICIAN ASSISTANT

## 2021-08-17 PROCEDURE — 3078F DIAST BP <80 MM HG: CPT | Performed by: PHYSICIAN ASSISTANT

## 2021-08-17 PROCEDURE — 99213 OFFICE O/P EST LOW 20 MIN: CPT | Performed by: PHYSICIAN ASSISTANT

## 2021-08-17 PROCEDURE — 3075F SYST BP GE 130 - 139MM HG: CPT | Performed by: PHYSICIAN ASSISTANT

## 2021-08-17 RX ORDER — ATORVASTATIN CALCIUM 40 MG/1
TABLET, FILM COATED ORAL
COMMUNITY
Start: 2021-07-05 | End: 2021-08-17

## 2021-08-17 RX ORDER — EMPAGLIFLOZIN 25 MG/1
1 TABLET, FILM COATED ORAL EVERY MORNING
COMMUNITY
Start: 2021-07-31 | End: 2021-08-17

## 2021-08-17 RX ORDER — LISINOPRIL 10 MG/1
10 TABLET ORAL DAILY
Qty: 90 TABLET | Refills: 1 | Status: SHIPPED | OUTPATIENT
Start: 2021-08-17 | End: 2021-11-15

## 2021-08-17 RX ORDER — EMPAGLIFLOZIN 25 MG/1
25 TABLET, FILM COATED ORAL EVERY MORNING
Qty: 90 TABLET | Refills: 1 | Status: SHIPPED | OUTPATIENT
Start: 2021-08-17 | End: 2021-11-15

## 2021-08-17 RX ORDER — ATORVASTATIN CALCIUM 40 MG/1
40 TABLET, FILM COATED ORAL NIGHTLY
Qty: 90 TABLET | Refills: 1 | Status: SHIPPED | OUTPATIENT
Start: 2021-08-17 | End: 2021-11-15

## 2021-08-17 NOTE — TELEPHONE ENCOUNTER
Received lab test results from [facility]. Hemoglobin A1c 8.9. LDL cholesterol 128. Patient needs appointment to be seen this week or next week.

## 2021-08-19 NOTE — PROGRESS NOTES
HPI:   HPI  50year-old male is here in the office for follow up type 2 DM. Patient is doing fine at this time. Patient denies of chest pain, SOB, N/V/C/D, fever, dizziness, syncope, abdominal pain. There are no other concerns today.     Medications:     C Activity      Alcohol use: No      Drug use: No      Sexual activity: Not on file    Other Topics      Concerns:        Left Handed: Not Asked        Right Handed: Yes        Currently spends a great deal of time in the sun: Not Asked        Past Sunlamp T abdominal pain, blood in stool, constipation, diarrhea, nausea and vomiting. Skin: Negative for rash. 08/17/21  1828   BP: 133/74   Pulse: 89   Weight: 149 lb (67.6 kg)   Height: 5' 6\" (1.676 m)     Body mass index is 24.05 kg/m².     Physical Ex Detail Level: Zone with Metformin 1000 mg PO BID and Jardiance 25 mg PO QAM. Start Januvia 100 mg PO every day. Instruct patient to monitor BG pre and post meals. Discussed plan of care with pt and pt is in agreement. All questions answered.  Pt to call with questions or co Detail Level: Generalized

## 2022-02-25 DIAGNOSIS — E11.9 TYPE 2 DIABETES MELLITUS WITHOUT COMPLICATION, WITHOUT LONG-TERM CURRENT USE OF INSULIN (HCC): ICD-10-CM

## 2022-02-25 DIAGNOSIS — I10 ESSENTIAL HYPERTENSION: ICD-10-CM

## 2022-02-25 RX ORDER — EMPAGLIFLOZIN 25 MG/1
TABLET, FILM COATED ORAL
Qty: 90 TABLET | Refills: 1 | OUTPATIENT
Start: 2022-02-25

## 2022-02-25 RX ORDER — LISINOPRIL 10 MG/1
TABLET ORAL
Qty: 90 TABLET | Refills: 1 | OUTPATIENT
Start: 2022-02-25

## 2022-02-25 NOTE — TELEPHONE ENCOUNTER
Please review.  Protocol Failed or has no Protocol  Requested Prescriptions   Pending Prescriptions Disp Refills    LISINOPRIL 10 MG Oral Tab [Pharmacy Med Name: LISINOPRIL 10MG TABLETS] 90 tablet 1     Sig: TAKE 1 TABLET(10 MG) BY MOUTH DAILY        Hypertensive Medications Protocol Failed - 2/25/2022  3:35 PM        Failed - CMP or BMP in past 12 months        Failed - Appointment in past 6 or next 3 months        Passed - GFR Non- > 50     Lab Results   Component Value Date    GFRNAA 107 09/30/2019                    JARDIANCE 25 MG Oral Tab [Pharmacy Med Name: Dasie Hilts 25MG TABLETS] 90 tablet 1     Sig: TAKE 1 TABLET BY MOUTH EVERY MORNING        Diabetes Medication Protocol Failed - 2/25/2022  5:45 AM        Failed - Last A1C < 7.5 and within past 6 months     Lab Results   Component Value Date    A1C 8.4 (H) 09/30/2019               Failed - Appointment in past 6 or next 3 months        Failed - GFR in the past 12 months        Passed - GFR Non- > 50     Lab Results   Component Value Date    GFRNAA 107 09/30/2019                    METFORMIN HCL 1000 MG Oral Tab [Pharmacy Med Name: METFORMIN 1000MG TABLETS] 180 tablet 1     Sig: TAKE 1 TABLET(1000 MG) BY MOUTH TWICE DAILY WITH MEALS        Diabetes Medication Protocol Failed - 2/25/2022  5:45 AM        Failed - Last A1C < 7.5 and within past 6 months     Lab Results   Component Value Date    A1C 8.4 (H) 09/30/2019               Failed - Appointment in past 6 or next 3 months        Failed - GFR in the past 12 months        Passed - GFR Non- > 50     Lab Results   Component Value Date    GFRNAA 107 09/30/2019                       Recent Outpatient Visits              6 months ago Mixed hyperlipidemia    [facility] Karina Goyal PA-C    Office Visit    1 year ago Mixed hyperlipidemia    [facility] Karina Goyal PA-C    Office Visit    1 year ago Routine general medical examination at a health care facility    [address] [facility] Shaun Baires PA-C    Office Visit    1 year ago Panic anxiety syndrome    [address] [facility] Jessie Matamoros DO    Virtual Phone E/M    2 years ago Closed nondisplaced fracture of distal phalanx of left thumb, initial encounter    [address] [place] [address],[address], Yoselin Andersen MD    Office Visit

## 2022-03-11 DIAGNOSIS — E11.9 TYPE 2 DIABETES MELLITUS WITHOUT COMPLICATION, WITHOUT LONG-TERM CURRENT USE OF INSULIN (HCC): ICD-10-CM

## 2022-03-12 RX ORDER — EMPAGLIFLOZIN 25 MG/1
TABLET, FILM COATED ORAL
Qty: 90 TABLET | Refills: 1 | OUTPATIENT
Start: 2022-03-12

## 2022-03-12 NOTE — TELEPHONE ENCOUNTER
Please review refill protocol failed/ no protocol  Requested Prescriptions   Pending Prescriptions Disp Refills    METFORMIN HCL 1000 MG Oral Tab [Pharmacy Med Name: METFORMIN 1000MG TABLETS] 180 tablet 1     Sig: TAKE 1 TABLET(1000 MG) BY MOUTH TWICE DAILY WITH MEALS        Diabetes Medication Protocol Failed - 3/11/2022  5:50 PM        Failed - Last A1C < 7.5 and within past 6 months     Lab Results   Component Value Date    A1C 8.4 (H) 09/30/2019               Failed - Appointment in past 6 or next 3 months        Failed - GFR in the past 12 months        Passed - GFR Non- > 50     Lab Results   Component Value Date    GFRNAA 107 09/30/2019                    JARDIANCE 25 MG Oral Tab [Pharmacy Med Name: Alexandria Tristan 25MG TABLETS] 90 tablet 1     Sig: TAKE 1 TABLET BY MOUTH EVERY MORNING        Diabetes Medication Protocol Failed - 3/11/2022  5:50 PM        Failed - Last A1C < 7.5 and within past 6 months     Lab Results   Component Value Date    A1C 8.4 (H) 09/30/2019               Failed - Appointment in past 6 or next 3 months        Failed - GFR in the past 12 months        Passed - GFR Non- > 50     Lab Results   Component Value Date    GFRNAA 107 09/30/2019

## 2022-03-23 DIAGNOSIS — E11.9 TYPE 2 DIABETES MELLITUS WITHOUT COMPLICATION, WITHOUT LONG-TERM CURRENT USE OF INSULIN (HCC): ICD-10-CM

## 2022-03-24 NOTE — TELEPHONE ENCOUNTER
Please review; protocol failed/no protocol.   Requested Prescriptions   Pending Prescriptions Disp Refills    METFORMIN HCL 1000 MG Oral Tab [Pharmacy Med Name: METFORMIN 1000MG TABLETS] 180 tablet 1     Sig: TAKE 1 TABLET(1000 MG) BY MOUTH TWICE DAILY WITH MEALS        Diabetes Medication Protocol Failed - 3/23/2022  8:34 PM        Failed - Last A1C < 7.5 and within past 6 months     Lab Results   Component Value Date    A1C 8.4 (H) 09/30/2019               Failed - Appointment in past 6 or next 3 months        Failed - GFR in the past 12 months        Passed - GFR Non- > 50     Lab Results   Component Value Date    GFRNAA 107 09/30/2019                     Recent Outpatient Visits              7 months ago Mixed hyperlipidemia    [address] [facility], [address] [facility], [place]    Office Visit    1 year ago Mixed hyperlipidemia    [address] [facility] Jeffry Huggins PA-C    Office Visit    1 year ago Routine general medical examination at a health care facility    [address] [facility] Jeffry Huggins PA-C    Office Visit    1 year ago Panic anxiety syndrome    [facility], [address],     Virtual Phone E/M    2 years ago Closed nondisplaced fracture of distal phalanx of left thumb, initial encounter    [address] [facility],[address], [address] [facility],[address], Juliana Hirsch MD    Office Visit

## 2022-04-05 DIAGNOSIS — E11.9 TYPE 2 DIABETES MELLITUS WITHOUT COMPLICATION, WITHOUT LONG-TERM CURRENT USE OF INSULIN (HCC): ICD-10-CM

## 2022-04-26 ENCOUNTER — TELEPHONE (OUTPATIENT)
Dept: FAMILY MEDICINE CLINIC | Facility: CLINIC | Age: 47
End: 2022-04-26

## 2022-04-26 RX ORDER — EMPAGLIFLOZIN 25 MG/1
1 TABLET, FILM COATED ORAL EVERY MORNING
Qty: 90 TABLET | Refills: 0 | Status: SHIPPED | OUTPATIENT
Start: 2022-04-26 | End: 2022-07-19

## 2022-05-16 ENCOUNTER — OFFICE VISIT (OUTPATIENT)
Dept: FAMILY MEDICINE CLINIC | Facility: CLINIC | Age: 47
End: 2022-05-16
Payer: COMMERCIAL

## 2022-05-16 VITALS
HEART RATE: 94 BPM | SYSTOLIC BLOOD PRESSURE: 137 MMHG | HEIGHT: 66 IN | WEIGHT: 149 LBS | BODY MASS INDEX: 23.95 KG/M2 | DIASTOLIC BLOOD PRESSURE: 84 MMHG

## 2022-05-16 DIAGNOSIS — I10 ESSENTIAL HYPERTENSION: ICD-10-CM

## 2022-05-16 DIAGNOSIS — E78.2 MIXED HYPERLIPIDEMIA: ICD-10-CM

## 2022-05-16 DIAGNOSIS — E11.9 TYPE 2 DIABETES MELLITUS WITHOUT COMPLICATION, WITHOUT LONG-TERM CURRENT USE OF INSULIN (HCC): Primary | ICD-10-CM

## 2022-05-16 PROCEDURE — 3079F DIAST BP 80-89 MM HG: CPT | Performed by: FAMILY MEDICINE

## 2022-05-16 PROCEDURE — 3075F SYST BP GE 130 - 139MM HG: CPT | Performed by: FAMILY MEDICINE

## 2022-05-16 PROCEDURE — 3008F BODY MASS INDEX DOCD: CPT | Performed by: FAMILY MEDICINE

## 2022-05-16 PROCEDURE — 99214 OFFICE O/P EST MOD 30 MIN: CPT | Performed by: FAMILY MEDICINE

## 2022-05-16 RX ORDER — ROSUVASTATIN CALCIUM 40 MG/1
40 TABLET, COATED ORAL NIGHTLY
Qty: 90 TABLET | Refills: 1 | Status: SHIPPED | OUTPATIENT
Start: 2022-05-16

## 2022-05-16 RX ORDER — LISINOPRIL 10 MG/1
TABLET ORAL
COMMUNITY
Start: 2022-01-29

## 2022-05-16 RX ORDER — ATORVASTATIN CALCIUM 40 MG/1
TABLET, FILM COATED ORAL
COMMUNITY
Start: 2022-02-25 | End: 2022-05-16

## 2022-05-16 NOTE — PROGRESS NOTES
Blood pressure 137/84, pulse 94, height 5' 6\" (1.676 m), weight 149 lb (67.6 kg). Patient presents today following up for type 2 diabetes. Denies chest pain, dyspnea or hypoglycemia. Has not been very compliant with his cholesterol medication. Has been exercising more lately. Denies anxiety at this time. Objective feet with good pulses and intact skin    LDL cholesterol 200    Glycohemoglobin level 8.0    Assessment #1 type 2 diabetes uncontrolled #2 hyperlipidemia uncontrolled #3 hypertension controlled #4 anxiety in remission    Plan #1 add Januvia patient tolerated well previously diabetic eye exam orders given and diabetic education information given #2 discontinue atorvastatin start rosuvastatin #3 continue lisinopril #4 no medications at this time    Follow-up in 4 months for physical fasting blood test prior.

## 2022-07-19 RX ORDER — EMPAGLIFLOZIN 25 MG/1
1 TABLET, FILM COATED ORAL EVERY MORNING
Qty: 90 TABLET | Refills: 1 | Status: SHIPPED | OUTPATIENT
Start: 2022-07-19 | End: 2022-11-25

## 2022-07-30 ENCOUNTER — APPOINTMENT (OUTPATIENT)
Dept: GENERAL RADIOLOGY | Age: 47
End: 2022-07-30
Attending: EMERGENCY MEDICINE
Payer: OTHER MISCELLANEOUS

## 2022-07-30 ENCOUNTER — HOSPITAL ENCOUNTER (OUTPATIENT)
Age: 47
Discharge: HOME | End: 2022-07-30
Attending: EMERGENCY MEDICINE
Payer: OTHER MISCELLANEOUS

## 2022-07-30 VITALS
SYSTOLIC BLOOD PRESSURE: 138 MMHG | RESPIRATION RATE: 20 BRPM | OXYGEN SATURATION: 97 % | TEMPERATURE: 98 F | DIASTOLIC BLOOD PRESSURE: 70 MMHG | HEART RATE: 83 BPM

## 2022-07-30 DIAGNOSIS — S60.10XA SUBUNGUAL HEMATOMA OF FINGERNAIL, INITIAL ENCOUNTER: Primary | ICD-10-CM

## 2022-07-30 PROCEDURE — 99213 OFFICE O/P EST LOW 20 MIN: CPT

## 2022-07-30 PROCEDURE — 11740 EVACUATION SUBUNGUAL HMTMA: CPT

## 2022-07-30 PROCEDURE — 99203 OFFICE O/P NEW LOW 30 MIN: CPT

## 2022-07-30 PROCEDURE — 73140 X-RAY EXAM OF FINGER(S): CPT | Performed by: EMERGENCY MEDICINE

## 2022-07-30 RX ORDER — IBUPROFEN 600 MG/1
600 TABLET ORAL ONCE
Status: COMPLETED | OUTPATIENT
Start: 2022-07-30 | End: 2022-07-30

## 2022-07-30 NOTE — ED INITIAL ASSESSMENT (HPI)
Patient with crush injury to left thumb. States 2 metal tubes crushed his left thumb while at work yesterday.  + bruising noted to the nailbed.

## 2022-11-22 ENCOUNTER — TELEPHONE (OUTPATIENT)
Dept: FAMILY MEDICINE CLINIC | Facility: CLINIC | Age: 47
End: 2022-11-22

## 2022-11-25 ENCOUNTER — OFFICE VISIT (OUTPATIENT)
Dept: FAMILY MEDICINE CLINIC | Facility: CLINIC | Age: 47
End: 2022-11-25
Payer: COMMERCIAL

## 2022-11-25 VITALS
SYSTOLIC BLOOD PRESSURE: 133 MMHG | BODY MASS INDEX: 23.14 KG/M2 | DIASTOLIC BLOOD PRESSURE: 76 MMHG | HEIGHT: 66 IN | HEART RATE: 86 BPM | WEIGHT: 144 LBS

## 2022-11-25 DIAGNOSIS — E78.2 MIXED HYPERLIPIDEMIA: ICD-10-CM

## 2022-11-25 DIAGNOSIS — E11.9 DIABETES MELLITUS TYPE 2 IN NONOBESE (HCC): Primary | ICD-10-CM

## 2022-11-25 DIAGNOSIS — Z12.11 ENCOUNTER FOR SCREENING COLONOSCOPY: ICD-10-CM

## 2022-11-25 DIAGNOSIS — I10 ESSENTIAL HYPERTENSION: ICD-10-CM

## 2022-11-25 PROCEDURE — 90686 IIV4 VACC NO PRSV 0.5 ML IM: CPT | Performed by: FAMILY MEDICINE

## 2022-11-25 PROCEDURE — 3078F DIAST BP <80 MM HG: CPT | Performed by: FAMILY MEDICINE

## 2022-11-25 PROCEDURE — 3075F SYST BP GE 130 - 139MM HG: CPT | Performed by: FAMILY MEDICINE

## 2022-11-25 PROCEDURE — 3008F BODY MASS INDEX DOCD: CPT | Performed by: FAMILY MEDICINE

## 2022-11-25 PROCEDURE — 99213 OFFICE O/P EST LOW 20 MIN: CPT | Performed by: FAMILY MEDICINE

## 2022-11-25 PROCEDURE — 90471 IMMUNIZATION ADMIN: CPT | Performed by: FAMILY MEDICINE

## 2022-11-25 RX ORDER — LISINOPRIL 10 MG/1
10 TABLET ORAL DAILY
Qty: 90 TABLET | Refills: 1 | Status: SHIPPED | OUTPATIENT
Start: 2022-11-25

## 2022-11-25 RX ORDER — EMPAGLIFLOZIN 25 MG/1
1 TABLET, FILM COATED ORAL EVERY MORNING
Qty: 90 TABLET | Refills: 1 | Status: SHIPPED | OUTPATIENT
Start: 2022-11-25

## 2022-11-25 RX ORDER — ROSUVASTATIN CALCIUM 40 MG/1
40 TABLET, COATED ORAL NIGHTLY
Qty: 90 TABLET | Refills: 1 | Status: SHIPPED | OUTPATIENT
Start: 2022-11-25

## 2022-11-25 RX ORDER — ATORVASTATIN CALCIUM 40 MG/1
TABLET, FILM COATED ORAL
COMMUNITY
Start: 2022-09-23 | End: 2022-11-25

## 2022-11-25 NOTE — PROGRESS NOTES
Blood pressure 133/76, pulse 86, height 5' 6\" (1.676 m), weight 144 lb (65.3 kg). Following up for type 2 diabetes denies chest pain or dyspnea no hypoglycemia. Losing weight deliberately.     Objective feet with good pulses and intact skin    Assessment type 2 diabetes hyperlipidemia hypertension    Plan we will await scanning of blood test results also encourage compliance for eye exam and gastroenterology colonoscopy    [facility] imaging order printed for coronary calcium score    Will follow with results

## 2023-03-03 ENCOUNTER — TELEPHONE (OUTPATIENT)
Dept: FAMILY MEDICINE CLINIC | Facility: CLINIC | Age: 48
End: 2023-03-03

## 2023-06-20 RX ORDER — EMPAGLIFLOZIN 25 MG/1
1 TABLET, FILM COATED ORAL EVERY MORNING
Qty: 90 TABLET | Refills: 0 | OUTPATIENT
Start: 2023-06-20

## 2023-06-20 NOTE — TELEPHONE ENCOUNTER
Duplicate request, previously addressed. Too soon     Disp Refills Start End    Empagliflozin (JARDIANCE) 25 MG Oral Tab 90 tablet 0 6/1/2023     Sig - Route:  Take 1 tablet by mouth every morning. - Oral    Sent to pharmacy as: Jardiance 25 MG Oral Tablet (Empagliflozin)    E-Prescribing Status: Receipt confirmed by pharmacy (6/1/2023  3:06 PM CDT)

## 2023-06-22 ENCOUNTER — TELEPHONE (OUTPATIENT)
Dept: FAMILY MEDICINE CLINIC | Facility: CLINIC | Age: 48
End: 2023-06-22

## 2023-06-23 ENCOUNTER — MED REC SCAN ONLY (OUTPATIENT)
Dept: FAMILY MEDICINE CLINIC | Facility: CLINIC | Age: 48
End: 2023-06-23

## 2023-06-24 ENCOUNTER — LAB ENCOUNTER (OUTPATIENT)
Dept: LAB | Age: 48
End: 2023-06-24
Attending: FAMILY MEDICINE
Payer: COMMERCIAL

## 2023-06-24 ENCOUNTER — OFFICE VISIT (OUTPATIENT)
Dept: FAMILY MEDICINE CLINIC | Facility: CLINIC | Age: 48
End: 2023-06-24

## 2023-06-24 VITALS
HEIGHT: 66 IN | BODY MASS INDEX: 23.46 KG/M2 | SYSTOLIC BLOOD PRESSURE: 113 MMHG | HEART RATE: 93 BPM | WEIGHT: 146 LBS | DIASTOLIC BLOOD PRESSURE: 69 MMHG

## 2023-06-24 DIAGNOSIS — E11.9 DIABETES MELLITUS TYPE 2 IN NONOBESE (HCC): ICD-10-CM

## 2023-06-24 DIAGNOSIS — Z12.11 ENCOUNTER FOR SCREENING COLONOSCOPY: ICD-10-CM

## 2023-06-24 DIAGNOSIS — Z12.11 ENCOUNTER FOR SCREENING COLONOSCOPY: Primary | ICD-10-CM

## 2023-06-24 LAB
CREAT UR-SCNC: 33.5 MG/DL
MICROALBUMIN UR-MCNC: <0.5 MG/DL

## 2023-06-24 PROCEDURE — 82570 ASSAY OF URINE CREATININE: CPT

## 2023-06-24 PROCEDURE — 82043 UR ALBUMIN QUANTITATIVE: CPT

## 2023-06-24 PROCEDURE — 3061F NEG MICROALBUMINURIA REV: CPT | Performed by: PHYSICIAN ASSISTANT

## 2023-06-24 RX ORDER — LINAGLIPTIN 5 MG/1
5 TABLET, FILM COATED ORAL DAILY
COMMUNITY
Start: 2023-06-09

## 2023-06-24 RX ORDER — ROSUVASTATIN CALCIUM 20 MG/1
20 TABLET, COATED ORAL NIGHTLY
Qty: 90 TABLET | Refills: 0 | COMMUNITY
Start: 2023-06-24

## 2023-06-24 RX ORDER — TRAZODONE HYDROCHLORIDE 50 MG/1
50 TABLET ORAL NIGHTLY
Qty: 30 TABLET | Refills: 0 | Status: SHIPPED | OUTPATIENT
Start: 2023-06-24

## 2023-06-24 NOTE — PROGRESS NOTES
Blood pressure 113/69, pulse 93, height 5' 6\" (1.676 m), weight 146 lb (66.2 kg). Presents today following up for type 2 diabetes and hypertension. Denies chest pain or dyspnea. Reports he is not sleeping well. Labs were done at outside location. Also complains of a headache on top of his head he has had for 9 days. He gets relief with Tylenol ibuprofen no vomiting. Stopped caffeine 7 days ago. Some improvement with the headache. Objective    Bilateral barefoot skin diabetic exam is normal, visualized feet and the appearance is normal.  Bilateral monofilament/sensation of both feet is normal.  Pulsation pedal pulse exam of both lower legs/feet is normal as well. CN II-XII GROSSLY INTACT MUSCLE STRENGTH +5/5 UPPER AND LOWER EXTREMITIES B/L DTR'S UPPER AND LOWER +2/4 UPPER AND LOWER EXTREMITIES B/L NEG PRONATOR DRIFT NEG BABINSKI NO CEREBELLAR SIGNS VISUAL FIELDS INTACT    No tenderness to TMJ bilaterally    Assessment #1 type 2 diabetes uncontrolled hemoglobin A1c 8.0#2 low testosterone #3 headache #4 insomnia    Plan #1 diabetic classes encouraged advised patient classes are given in Maltese and that they are given on the weekends. Also follow-up with Momo Mckeon PA-C for further instruction #2 Momo Mckeon PA-C #3 decrease rosuvastatin to 20 mg daily #4 trazodone for sleep    Diabetic eye exam order given compliance encouraged    Referral for screening colonoscopy    We will check urine microalbumin    Coronary calcium score ordered also.

## 2023-06-26 ENCOUNTER — TELEPHONE (OUTPATIENT)
Dept: FAMILY MEDICINE CLINIC | Facility: CLINIC | Age: 48
End: 2023-06-26

## 2023-06-26 NOTE — TELEPHONE ENCOUNTER
----- Message from Sharon Forte DO sent at 6/26/2023 11:02 AM CDT -----  Urine microalbumin normal.  Patient to follow-up with Georgia Ni PA-C for low testosterone and uncontrolled diabetes.

## 2023-06-26 NOTE — TELEPHONE ENCOUNTER
Left message for patient to return phone call, see below   Please schedule patient for diabetes follow up with Too Aguilar

## 2023-06-29 ENCOUNTER — OFFICE VISIT (OUTPATIENT)
Dept: FAMILY MEDICINE CLINIC | Facility: CLINIC | Age: 48
End: 2023-06-29

## 2023-06-29 VITALS
HEIGHT: 66 IN | BODY MASS INDEX: 23.3 KG/M2 | DIASTOLIC BLOOD PRESSURE: 64 MMHG | WEIGHT: 145 LBS | SYSTOLIC BLOOD PRESSURE: 111 MMHG | HEART RATE: 88 BPM

## 2023-06-29 DIAGNOSIS — R79.89 LOW TESTOSTERONE IN MALE: ICD-10-CM

## 2023-06-29 DIAGNOSIS — E11.65 TYPE 2 DIABETES MELLITUS WITH HYPERGLYCEMIA, WITHOUT LONG-TERM CURRENT USE OF INSULIN (HCC): Primary | ICD-10-CM

## 2023-06-29 PROCEDURE — 3008F BODY MASS INDEX DOCD: CPT | Performed by: PHYSICIAN ASSISTANT

## 2023-06-29 PROCEDURE — 3078F DIAST BP <80 MM HG: CPT | Performed by: PHYSICIAN ASSISTANT

## 2023-06-29 PROCEDURE — 3074F SYST BP LT 130 MM HG: CPT | Performed by: PHYSICIAN ASSISTANT

## 2023-06-29 PROCEDURE — 99213 OFFICE O/P EST LOW 20 MIN: CPT | Performed by: PHYSICIAN ASSISTANT

## 2023-06-29 RX ORDER — SEMAGLUTIDE 0.68 MG/ML
0.25 INJECTION, SOLUTION SUBCUTANEOUS WEEKLY
Qty: 3 ML | Refills: 0 | Status: SHIPPED | OUTPATIENT
Start: 2023-06-29 | End: 2023-07-29

## 2023-06-30 ENCOUNTER — TELEPHONE (OUTPATIENT)
Dept: FAMILY MEDICINE CLINIC | Facility: CLINIC | Age: 48
End: 2023-06-30

## 2023-07-21 RX ORDER — TRAZODONE HYDROCHLORIDE 50 MG/1
50 TABLET ORAL NIGHTLY
Qty: 30 TABLET | Refills: 0 | Status: SHIPPED | OUTPATIENT
Start: 2023-07-21

## 2023-07-21 NOTE — TELEPHONE ENCOUNTER
Routed to Dr Renee OLMOS for Dr Rebecca Fong for advise, thanks.       Please review refill protocol failed/ no protocol  Requested Prescriptions   Pending Prescriptions Disp Refills    TRAZODONE 50 MG Oral Tab [Pharmacy Med Name: TRAZODONE 50 MG TABLET] 30 tablet 0     Sig: TAKE 1 TABLET (50 MG) BY MOUTH NIGHTLY SLEEP       There is no refill protocol information for this order

## 2023-08-05 DIAGNOSIS — E11.65 TYPE 2 DIABETES MELLITUS WITH HYPERGLYCEMIA, WITHOUT LONG-TERM CURRENT USE OF INSULIN (HCC): ICD-10-CM

## 2023-08-07 RX ORDER — SEMAGLUTIDE 0.68 MG/ML
0.5 INJECTION, SOLUTION SUBCUTANEOUS WEEKLY
Qty: 9 ML | Refills: 1 | Status: SHIPPED | OUTPATIENT
Start: 2023-08-07 | End: 2023-11-05

## 2023-08-07 RX ORDER — TRAZODONE HYDROCHLORIDE 50 MG/1
50 TABLET ORAL NIGHTLY
Qty: 90 TABLET | Refills: 0 | Status: SHIPPED | OUTPATIENT
Start: 2023-08-07

## 2023-08-07 NOTE — TELEPHONE ENCOUNTER
traZODone 50 MG Oral Tab, Take 1 tablet (50 mg total) by mouth nightly., Disp: 30 tablet, Rfl: 0      90 day request

## 2023-08-07 NOTE — TELEPHONE ENCOUNTER
Please review. Protocol failed / Has no protocol.      Requested Prescriptions   Pending Prescriptions Disp Refills    OZEMPIC, 0.25 OR 0.5 MG/DOSE, 2 MG/3ML Subcutaneous Solution Pen-injector [Pharmacy Med Name: Bull Wiley 0.25-0.5 MG/DOSE PEN]  0     Sig: INJECT 0.25MG INTO THE SKIN ONE TIME PER WEEK       Diabetes Medication Protocol Failed - 8/5/2023 10:27 AM        Failed - Last A1C < 7.5 and within past 6 months     Lab Results   Component Value Date    A1C 8.4 (H) 09/30/2019             Failed - EGFRCR or GFRNAA > 50     GFR Evaluation            Failed - GFR in the past 12 months        Passed - In person appointment or virtual visit in the past 6 mos or appointment in next 3 mos     Recent Outpatient Visits              1 month ago Type 2 diabetes mellitus with hyperglycemia, without long-term current use of insulin ([facility] [address]    [address],[place] [address], [address], [place], [place]    Office Visit    1 month ago Encounter for screening colonoscopy    [facility], [address], [place], [facility]    Office Visit    8 months ago Diabetes mellitus type 2 in nonobese [facility])    [address],[facility] [address], [place], Sinda Simmichelle, DO    Office Visit    1 year ago Type 2 diabetes mellitus without complication, without long-term current use of insulin ([facility] [address]    [address],[place] [address], [address], [place], Quirinoda Simmichelle, DO    Office Visit    1 year ago Mixed hyperlipidemia    [facility], [address], [place], [place]    Office Visit          Future Appointments         Provider Department Appt Notes    In 1 month Aileen Lerner MD [facility], [address] Low Testosterone Levels (policy informed)                  Future Appointments         Provider Department Appt Notes    In 1 month Aileen Lerner MD [facility], [address] [facility] Testosterone Levels (policy informed)           Recent Outpatient Visits              1 month ago Type 2 diabetes mellitus with hyperglycemia, without long-term current use of insulin ([facility] [address]    [address], [place], [employer]    Office Visit    1 month ago Encounter for screening colonoscopy    [facility], [address], [place], Sharon Duran, DO    Office Visit    8 months ago Diabetes mellitus type 2 in nonobese [facility])    [address], [place], Sharon Duran, DO    Office Visit    1 year ago Type 2 diabetes mellitus without complication, without long-term current use of insulin ([facility] [address] [facility]    [address] [facility], [place] Staley, DO    Office Visit    1 year ago Mixed hyperlipidemia    [facility], [address], [place], [employer]    Office Visit

## 2023-08-25 ENCOUNTER — MED REC SCAN ONLY (OUTPATIENT)
Dept: FAMILY MEDICINE CLINIC | Facility: CLINIC | Age: 48
End: 2023-08-25

## 2023-08-29 ENCOUNTER — TELEPHONE (OUTPATIENT)
Dept: FAMILY MEDICINE CLINIC | Facility: CLINIC | Age: 48
End: 2023-08-29

## 2023-08-29 RX ORDER — ROSUVASTATIN CALCIUM 40 MG/1
40 TABLET, COATED ORAL NIGHTLY
Qty: 30 TABLET | Refills: 0 | Status: SHIPPED | OUTPATIENT
Start: 2023-08-29 | End: 2023-08-29

## 2023-08-29 RX ORDER — ROSUVASTATIN CALCIUM 20 MG/1
20 TABLET, COATED ORAL NIGHTLY
Qty: 30 TABLET | Refills: 0 | Status: SHIPPED | OUTPATIENT
Start: 2023-08-29

## 2023-08-29 RX ORDER — LISINOPRIL 10 MG/1
10 TABLET ORAL DAILY
Qty: 30 TABLET | Refills: 0 | Status: SHIPPED | OUTPATIENT
Start: 2023-08-29

## 2023-09-04 ENCOUNTER — APPOINTMENT (OUTPATIENT)
Dept: CT IMAGING | Facility: HOSPITAL | Age: 48
End: 2023-09-04
Attending: EMERGENCY MEDICINE
Payer: COMMERCIAL

## 2023-09-04 ENCOUNTER — HOSPITAL ENCOUNTER (OUTPATIENT)
Age: 48
Discharge: ANOTHER HOSPITAL | End: 2023-09-04
Attending: EMERGENCY MEDICINE
Payer: COMMERCIAL

## 2023-09-04 ENCOUNTER — HOSPITAL ENCOUNTER (EMERGENCY)
Facility: HOSPITAL | Age: 48
Discharge: HOME | End: 2023-09-04
Attending: EMERGENCY MEDICINE
Payer: COMMERCIAL

## 2023-09-04 VITALS
HEART RATE: 92 BPM | TEMPERATURE: 98 F | OXYGEN SATURATION: 99 % | SYSTOLIC BLOOD PRESSURE: 106 MMHG | DIASTOLIC BLOOD PRESSURE: 72 MMHG | RESPIRATION RATE: 16 BRPM

## 2023-09-04 VITALS
SYSTOLIC BLOOD PRESSURE: 158 MMHG | TEMPERATURE: 98 F | DIASTOLIC BLOOD PRESSURE: 88 MMHG | RESPIRATION RATE: 22 BRPM | OXYGEN SATURATION: 100 % | HEART RATE: 70 BPM

## 2023-09-04 DIAGNOSIS — R10.9 ACUTE LEFT FLANK PAIN: Primary | ICD-10-CM

## 2023-09-04 DIAGNOSIS — N20.1 URETERAL STONE: Primary | ICD-10-CM

## 2023-09-04 LAB
ALBUMIN SERPL-MCNC: 4.1 G/DL (ref 3.4–5)
ALBUMIN/GLOB SERPL: 1.2 {RATIO} (ref 1–2)
ALP LIVER SERPL-CCNC: 64 U/L
ALT SERPL-CCNC: 25 U/L
ANION GAP SERPL CALC-SCNC: 7 MMOL/L (ref 0–18)
AST SERPL-CCNC: 17 U/L (ref 15–37)
BASOPHILS # BLD AUTO: 0.03 X10(3) UL (ref 0–0.2)
BASOPHILS NFR BLD AUTO: 0.5 %
BILIRUB SERPL-MCNC: 0.9 MG/DL (ref 0.1–2)
BILIRUB UR QL: NEGATIVE
BUN BLD-MCNC: 18 MG/DL (ref 7–18)
BUN/CREAT SERPL: 18.6 (ref 10–20)
CALCIUM BLD-MCNC: 9.2 MG/DL (ref 8.5–10.1)
CHLORIDE SERPL-SCNC: 103 MMOL/L (ref 98–112)
CLARITY UR: CLEAR
CO2 SERPL-SCNC: 27 MMOL/L (ref 21–32)
CREAT BLD-MCNC: 0.97 MG/DL
DEPRECATED RDW RBC AUTO: 40.8 FL (ref 35.1–46.3)
EGFRCR SERPLBLD CKD-EPI 2021: 96 ML/MIN/1.73M2 (ref 60–?)
EOSINOPHIL # BLD AUTO: 0.11 X10(3) UL (ref 0–0.7)
EOSINOPHIL NFR BLD AUTO: 1.8 %
ERYTHROCYTE [DISTWIDTH] IN BLOOD BY AUTOMATED COUNT: 11.9 % (ref 11–15)
GLOBULIN PLAS-MCNC: 3.5 G/DL (ref 2.8–4.4)
GLUCOSE BLD-MCNC: 247 MG/DL (ref 70–99)
GLUCOSE UR-MCNC: >1000 MG/DL
HCT VFR BLD AUTO: 44.8 %
HGB BLD-MCNC: 14.8 G/DL
IMM GRANULOCYTES # BLD AUTO: 0.01 X10(3) UL (ref 0–1)
IMM GRANULOCYTES NFR BLD: 0.2 %
KETONES UR-MCNC: 40 MG/DL
LEUKOCYTE ESTERASE UR QL STRIP.AUTO: NEGATIVE
LIPASE SERPL-CCNC: 39 U/L (ref 13–75)
LYMPHOCYTES # BLD AUTO: 1.55 X10(3) UL (ref 1–4)
LYMPHOCYTES NFR BLD AUTO: 24.8 %
MCH RBC QN AUTO: 30.8 PG (ref 26–34)
MCHC RBC AUTO-ENTMCNC: 33 G/DL (ref 31–37)
MCV RBC AUTO: 93.1 FL
MONOCYTES # BLD AUTO: 0.34 X10(3) UL (ref 0.1–1)
MONOCYTES NFR BLD AUTO: 5.4 %
NEUTROPHILS # BLD AUTO: 4.21 X10 (3) UL (ref 1.5–7.7)
NEUTROPHILS # BLD AUTO: 4.21 X10(3) UL (ref 1.5–7.7)
NEUTROPHILS NFR BLD AUTO: 67.3 %
NITRITE UR QL STRIP.AUTO: NEGATIVE
OSMOLALITY SERPL CALC.SUM OF ELEC: 294 MOSM/KG (ref 275–295)
PH UR: 5.5 [PH] (ref 5–8)
PLATELET # BLD AUTO: 216 10(3)UL (ref 150–450)
POTASSIUM SERPL-SCNC: 4.2 MMOL/L (ref 3.5–5.1)
PROT SERPL-MCNC: 7.6 G/DL (ref 6.4–8.2)
PROT UR-MCNC: NEGATIVE MG/DL
RBC # BLD AUTO: 4.81 X10(6)UL
RBC #/AREA URNS AUTO: >10 /HPF
SODIUM SERPL-SCNC: 137 MMOL/L (ref 136–145)
SP GR UR STRIP: >1.03 (ref 1–1.03)
UROBILINOGEN UR STRIP-ACNC: NORMAL
WBC # BLD AUTO: 6.3 X10(3) UL (ref 4–11)

## 2023-09-04 PROCEDURE — 81001 URINALYSIS AUTO W/SCOPE: CPT | Performed by: EMERGENCY MEDICINE

## 2023-09-04 PROCEDURE — 96374 THER/PROPH/DIAG INJ IV PUSH: CPT

## 2023-09-04 PROCEDURE — 85025 COMPLETE CBC W/AUTO DIFF WBC: CPT | Performed by: EMERGENCY MEDICINE

## 2023-09-04 PROCEDURE — 96375 TX/PRO/DX INJ NEW DRUG ADDON: CPT

## 2023-09-04 PROCEDURE — 99285 EMERGENCY DEPT VISIT HI MDM: CPT

## 2023-09-04 PROCEDURE — S0119 ONDANSETRON 4 MG: HCPCS

## 2023-09-04 PROCEDURE — 83690 ASSAY OF LIPASE: CPT | Performed by: EMERGENCY MEDICINE

## 2023-09-04 PROCEDURE — 80053 COMPREHEN METABOLIC PANEL: CPT | Performed by: EMERGENCY MEDICINE

## 2023-09-04 PROCEDURE — 99213 OFFICE O/P EST LOW 20 MIN: CPT

## 2023-09-04 PROCEDURE — 74176 CT ABD & PELVIS W/O CONTRAST: CPT | Performed by: EMERGENCY MEDICINE

## 2023-09-04 RX ORDER — TAMSULOSIN HYDROCHLORIDE 0.4 MG/1
0.4 CAPSULE ORAL DAILY
Qty: 7 CAPSULE | Refills: 0 | Status: SHIPPED | OUTPATIENT
Start: 2023-09-04 | End: 2023-09-06

## 2023-09-04 RX ORDER — ONDANSETRON 2 MG/ML
4 INJECTION INTRAMUSCULAR; INTRAVENOUS ONCE
Status: DISCONTINUED | OUTPATIENT
Start: 2023-09-04 | End: 2023-09-04

## 2023-09-04 RX ORDER — ONDANSETRON 4 MG/1
8 TABLET, ORALLY DISINTEGRATING ORAL ONCE
Status: COMPLETED | OUTPATIENT
Start: 2023-09-04 | End: 2023-09-04

## 2023-09-04 RX ORDER — HYDROCODONE BITARTRATE AND ACETAMINOPHEN 5; 325 MG/1; MG/1
1 TABLET ORAL EVERY 6 HOURS PRN
Qty: 10 TABLET | Refills: 0 | Status: SHIPPED | OUTPATIENT
Start: 2023-09-04 | End: 2023-09-11

## 2023-09-04 RX ORDER — ONDANSETRON 4 MG/1
4 TABLET, ORALLY DISINTEGRATING ORAL EVERY 4 HOURS PRN
Qty: 10 TABLET | Refills: 0 | Status: SHIPPED | OUTPATIENT
Start: 2023-09-04 | End: 2023-09-11

## 2023-09-04 RX ORDER — IBUPROFEN 600 MG/1
600 TABLET ORAL EVERY 8 HOURS PRN
Qty: 30 TABLET | Refills: 0 | Status: SHIPPED | OUTPATIENT
Start: 2023-09-04

## 2023-09-04 RX ORDER — MORPHINE SULFATE 4 MG/ML
4 INJECTION, SOLUTION INTRAMUSCULAR; INTRAVENOUS ONCE
Status: COMPLETED | OUTPATIENT
Start: 2023-09-04 | End: 2023-09-04

## 2023-09-04 RX ORDER — KETOROLAC TROMETHAMINE 15 MG/ML
30 INJECTION, SOLUTION INTRAMUSCULAR; INTRAVENOUS ONCE
Status: COMPLETED | OUTPATIENT
Start: 2023-09-04 | End: 2023-09-04

## 2023-09-04 NOTE — ED INITIAL ASSESSMENT (HPI)
Patient from [address] [place] [address] to r/o kidney stone. Reports intermittent severe pain in left flank. Reports nausea/denies fevers.

## 2023-09-04 NOTE — DISCHARGE INSTRUCTIONS
Take Ibuprofen 600 mg every 8 hours as needed for pain. If you continue to have pain take norco as prescribed for severe pain. Do not drink alcohol, drive, or take acetaminophen (tylenol) while taking norco.  Use an over the counter stool softener such as colace while taking norco.  Risk of addiction to pain medication increases after 3 days, make sure to use this for shortest duration as possible and only for severe pain. Take Flomax daily as prescribed. Stay hydrated and use Zofran as needed for nausea. See urology in 1 week for follow-up. Return to the ER if you develop worsening symptoms, fever, or any emergent concerns.

## 2023-09-04 NOTE — ED QUICK NOTES
Pt resting on cart, pt just returns from the [place] and reports after urinating he has less pain, pain rated 2/10 at this time. Pt denies nausea, declines pain and nausea medication. VSS, family at bedside.

## 2023-09-04 NOTE — ED INITIAL ASSESSMENT (HPI)
Presents with waves of left back/flank pain since this morning. Patient is restless on cart and moaning in pain. No nausea. No trauma. Pale in color.

## 2023-09-05 ENCOUNTER — TELEPHONE (OUTPATIENT)
Dept: SURGERY | Facility: CLINIC | Age: 48
End: 2023-09-05

## 2023-09-05 NOTE — TELEPHONE ENCOUNTER
Received message from ER - please see if patient can see me this week    Thanks  Ok to [employer].     AR

## 2023-09-06 ENCOUNTER — OFFICE VISIT (OUTPATIENT)
Dept: SURGERY | Facility: CLINIC | Age: 48
End: 2023-09-06

## 2023-09-06 DIAGNOSIS — N20.0 NEPHROLITHIASIS: Primary | ICD-10-CM

## 2023-09-06 PROCEDURE — 99204 OFFICE O/P NEW MOD 45 MIN: CPT | Performed by: UROLOGY

## 2023-09-06 RX ORDER — TAMSULOSIN HYDROCHLORIDE 0.4 MG/1
0.4 CAPSULE ORAL DAILY
Qty: 30 CAPSULE | Refills: 11 | Status: SHIPPED | OUTPATIENT
Start: 2023-09-06 | End: 2024-08-31

## 2023-09-06 NOTE — PROGRESS NOTES
Carlyn Chin MD  Department of Urology  [facility]  Jon Ford    T: [phone]  F: [phone]    To: Loli Joaquin DO   [address] [place] [address] Dr Muñoz: Ahsan Delaney   MRN: [id number]  : 1975    Dear Sita Pratt. DO Luís,    Today I had the pleasure of seeing Ahsan Delaney in my clinic. As you know, Mr. Saleem Said is a pleasant 50year old year old male who I am seeing for npehrolithiasis. Patient was last seen in this department on Visit date not found. Briefly, robles went to the ER on 2023 with flank pain. He was noted to have a 4mm left UVJ stone. Denies fevers, chills, nausea, vomiting. PAST MEDICAL HISTORY:  Past Medical History:   Diagnosis Date    Diabetes ([facility] [address]     Essential hypertension     Type 2 diabetes mellitus ([facility] [address]         PAST SURGICAL HISTORY:  No past surgical history on file. ALLERGIES:  No Known Allergies      MEDICATIONS:  Current Outpatient Medications   Medication Instructions    Empagliflozin (JARDIANCE) 25 MG Oral Tab 1 tablet, Oral, Every morning    HYDROcodone-acetaminophen 5-325 MG Oral Tab 1 tablet, Oral, Every 6 hours PRN    ibuprofen (MOTRIN) 600 mg, Oral, Every 8 hours PRN    lisinopril (ZESTRIL) 10 mg, Oral, Daily    metFORMIN HCl (GLUCOPHAGE) 1,000 mg, Oral, 2 times daily with meals    ondansetron (ZOFRAN-ODT) 4 mg, Oral, Every 4 hours PRN    Ozempic (0.25 or 0.5 MG/DOSE) 0.5 mg, Subcutaneous, Weekly    rosuvastatin (CRESTOR) 20 mg, Oral, Nightly    tamsulosin (FLOMAX) 0.4 mg, Oral, Daily    traZODone (DESYREL) 50 mg, Oral, Nightly        FAMILY HISTORY:  No family history on file.      SOCIAL HISTORY:  Social History     Socioeconomic History    Marital status: Single   Tobacco Use    Smoking status: Never    Smokeless tobacco: Never   Vaping Use    Vaping Use: Never used   Substance and Sexual Activity    Alcohol use: No    Drug use: No   Other Topics Concern    Right Handed Yes PHYSICAL EXAMINATION:  There were no vitals filed for this visit. CONSTITUTIONAL: No apparent distress, cooperative and communicative  NEUROLOGIC: Alert and oriented   HEAD: Normocephalic, atraumatic   EYES: Sclera non-icteric   ENT: Hearing intact, moist mucous membranes   NECK: No obvious goiter or masses   RESPIRATORY: Normal respiratory effort, Nonlabored breathing on room air  SKIN: No evident rashes   ABDOMEN: Soft, nontender, nondistended, no rebound tenderness, no guarding, no masses    REVIEW OF SYSTEMS:    A comprehensive 10-point review of systems was completed. Pertinent positives and negatives are noted in the the HPI. LABORATORY DATA:  Urine Color  Yellow Light-Yellow   Clarity Urine  Clear Clear   Spec Gravity  1.005 - 1.030 >1.030 High    Glucose Urine  Normal mg/dL >1000 Abnormal    Bilirubin Urine  Negative Negative   Ketones Urine  Negative mg/dL 40 Abnormal    Blood Urine  Negative 3+ Abnormal    pH Urine  5.0 - 8.0 5.5   Protein Urine  Negative mg/dL Negative   Urobilinogen Urine  Normal Normal   Nitrite Urine  Negative Negative   Leukocyte Esterase Urine  Negative Negative   WBC Urine  0 - 5 /HPF 1-5   RBC Urine  0 - 2 /HPF >10 Abnormal    Bacteria Urine  None Seen /HPF None Seen   Squamous Epi.  Cells  None Seen /HPF Few Abnormal    Renal Tubular Epithelial Cells  None Seen /HPF None Seen   Transitional Cells  None Seen /HPF None Seen   Yeast Urine  None Seen /HPF None Seen     Glucose  70 - 99 mg/dL 247 High    Sodium  136 - 145 mmol/L 137   Potassium  3.5 - 5.1 mmol/L 4.2   Chloride  98 - 112 mmol/L 103   CO2  21.0 - 32.0 mmol/L 27.0   Anion Gap  0 - 18 mmol/L 7   BUN  7 - 18 mg/dL 18   Creatinine  0.70 - 1.30 mg/dL 0.97   BUN/CREA Ratio  10.0 - 20.0 18.6   Calcium, Total  8.5 - 10.1 mg/dL 9.2   Calculated Osmolality  275 - 295 mOsm/kg 294   eGFR-Cr  >=60 mL/min/1.73m2 96   ALT  16 - 61 U/L 25   AST  15 - 37 U/L 17   Alkaline Phosphatase  45 - 117 U/L 64     WBC  4.0 - 11.0 x10(3) uL 6.3   RBC  4.30 - 5.70 x10(6)uL 4.81   HGB  13.0 - 17.5 g/dL 14.8   HCT  39.0 - 53.0 % 44.8   MCV  80.0 - 100.0 fL 93.1   MCH  26.0 - 34.0 pg 30.8   MCHC  31.0 - 37.0 g/dL 33.0   RDW-SD  35.1 - 46.3 fL 40.8   RDW  11.0 - 15.0 % 11.9   PLT  150.0 - 450.0 10(3)uL 216.0   Neutrophil Absolute Prelim  1.50 - 7.70 x10 (3) uL 4.21   Neutrophil Absolute  1.50 - 7.70 x10(3) uL 4.21           IMAGING REVIEW:  Narrative   PROCEDURE:   CT ABDOMEN+PELVIS(CPT=74176)     COMPARISON:   None. INDICATIONS:   left flank pain     TECHNIQUE: CT images of the abdomen and pelvis were obtained without intravenous contrast material.  Automated exposure control for dose reduction was used. Adjustment of the mA and/or kV was done based on the patient's size. Use of iterative  reconstruction technique for dose reduction was used. Dose information is transmitted to the [facility] of Radiology) NRDR ([address]) which includes the Dose Index Registry. FINDINGS:  Normal lower chest     Normal liver and gallbladder     Normal pancreas and spleen     Normal adrenals     4 millimeter stone at the left UVJ causes mild hydronephrosis and fat stranding. No other calculi. Normal right kidney     Normal size aorta     Normal bowel loops including appendix. No free fluid or air     Pelvis shows normal bladder     No fractures or bone lesion. Impression   CONCLUSION: 4 millimeter left UVJ stone causing mild upstream hydronephrosis           Dictated by (CST): Mirna Gómez MD on 9/04/2023 at 10:14 AM      Finalized by (CST): Mirna Gómez MD on 9/04/2023 at 10:16 AM          OTHER RELEVANT DATA:   none     IMPRESSION: 4mm left UVJ stone - offered MET versus definitive ureteroscopy. Patient opted for MET. Medical expulsive therapy includes increased hydration, straining of urine, pain control with ibuprofen, Tylenol, hot packs, monitoring for fevers and chills.   We usually pursue this over 4 to 6 weeks. If patient passes the stone, no further intervention is needed. If patient does not pass the stone in this timeframe, we can consider a CT scan to determine if the stone is present in which case we would proceed with surgery    We discussed ureteroscopy including how it is performed and associated risks. I reviewed risks including bleeding, infection, damage to surrounding structures (urethra, bladder, ureter, kidney), inability to treat the stone and need for stent alone, need for additional/multiple procedures, need for prolonged stent, need for nephrostomy tube, stent colic/discomfort (extreme flank pain, bladder discomfort/spasms, urinary urgency and frequency, hematuria), ureteral stricture, ureteral avulsion requiring open surgery, sepsis, anesthesia complications (cardiorespiratory complications). I also counseled patient that NSAIDs and blood thinning agents need to be stopped appropriately prior to surgery. Discussed return precautions including fevers, chills, nausea, vomiting, intractable pain. Stent colic including flank pain, urgency, frequency and blood in the urine is common. Patient agreed with the plan and understood the above. PLAN:  Tamsulosin 0.4mg at bedtime  Hydration  Straining urine  RTC 4 weeks for follow up (stone analysis placed) - if not passed the stone, will plan for CT versus proceed with URS    Thank you for referring this very pleasant patient to my clinic. If you have any questions or concerns, please do not hesitate to contact me. Sincerely,  Craoline Munroe MD    30 minutes were spent on this patient at this visit obtaining a history, reviewing medical records, developing a treatment plan, counseling and discussing treatment strategy with patient, coordination of care and documentation. The [facility] makes medical notes available to patients in the interest of transparency.   However, please be advised that this is a medical document. It is intended as a peer to peer communication. It is written in medical language and may contain abbreviations or verbiage that are technical and unfamiliar. It may appear blunt or direct. Medical documents are intended to carry relevant information, facts as evident, and the clinical opinion of the practitioner. Medical Decision Making  Undiagnosed new problem, nephrolithiasis     Amount and/or Complexity of Data Reviewed  External Data Reviewed: labs and notes. Radiology: independent interpretation performed. Risk  Prescription drug management.

## 2023-09-21 RX ORDER — LISINOPRIL 10 MG/1
10 TABLET ORAL DAILY
Qty: 90 TABLET | Refills: 0 | Status: SHIPPED | OUTPATIENT
Start: 2023-09-21

## 2023-09-21 NOTE — TELEPHONE ENCOUNTER
Refill passed per [facility] protocol.    Requested Prescriptions   Pending Prescriptions Disp Refills    LISINOPRIL 10 MG Oral Tab [Pharmacy Med Name: LISINOPRIL 10 MG TABLET] 30 tablet 0     Sig: TAKE 1 TABLET BY MOUTH EVERY DAY       Hypertensive Medications Protocol Passed - 9/21/2023 12:58 AM        Passed - In person appointment in the past 12 or next 3 months     Recent Outpatient Visits              2 weeks ago Nephrolithiasis    [facility], [address], Rodrick Hwang MD    Office Visit    2 months ago Type 2 diabetes mellitus with hyperglycemia, without long-term current use of insulin ([facility] [address]    [facility], [address], [place], [place]    Office Visit    2 months ago Encounter for screening colonoscopy    [facility], [address], [place], [address], DO    Office Visit    10 months ago Diabetes mellitus type 2 in nonobese [facility])    [employer] [employer] [facility], [address], [place], [address], DO    Office Visit    1 year ago Type 2 diabetes mellitus without complication, without long-term current use of insulin ([address]    [facility], [address], [place], [address], DO    Office Visit          Future Appointments         Provider Department Appt Notes    In 6 days Lillian Johnson MD [facility] [place] Low Testosterone Levels (policy informed)    In 1 week Svitlana Garcia MD [facility], [address] [facility] [facility], [place] 4 week  kidney stone  f/u    In 2 weeks Todd Penny DO [facility], [address], [place] follow up on meds policy informed to pt                    Passed - Last BP reading less than 140/90     BP Readings from Last 1 Encounters:  09/04/23 : 106/72              Passed - CMP or BMP in past 6 months     Recent Results (from the past 4392 hour(s))   Comp Metabolic Panel (14)    Collection Time: 09/04/23  9:10 AM   Result Value Ref Range    Glucose 247 (H) 70 - 99 mg/dL    Sodium 137 136 - 145 mmol/L    Potassium 4.2 3.5 - 5.1 mmol/L    Chloride 103 98 - 112 mmol/L    CO2 27.0 21.0 - 32.0 mmol/L    Anion Gap 7 0 - 18 mmol/L    BUN 18 7 - 18 mg/dL    Creatinine 0.97 0.70 - 1.30 mg/dL    BUN/CREA Ratio 18.6 10.0 - 20.0    Calcium, Total 9.2 8.5 - 10.1 mg/dL    Calculated Osmolality 294 275 - 295 mOsm/kg    eGFR-Cr 96 >=60 mL/min/1.73m2    ALT 25 16 - 61 U/L    AST 17 15 - 37 U/L    Alkaline Phosphatase 64 45 - 117 U/L    Bilirubin, Total 0.9 0.1 - 2.0 mg/dL    Total Protein 7.6 6.4 - 8.2 g/dL    Albumin 4.1 3.4 - 5.0 g/dL    Globulin  3.5 2.8 - 4.4 g/dL    A/G Ratio 1.2 1.0 - 2.0     *Note: Due to a large number of results and/or encounters for the requested time period, some results have not been displayed. A complete set of results can be found in Results Review.                Passed - In person appointment or virtual visit in the past 6 months     Recent Outpatient Visits              2 weeks ago Nephrolithiasis    John Solis MD    Office Visit    2 months ago Type 2 diabetes mellitus with hyperglycemia, without long-term current use of insulin ([address]    [address], [address], [place], [employer]    Office Visit    2 months ago Encounter for screening colonoscopy    [facility], [address], [place], Sara Grimes, DO    Office Visit    10 months ago Diabetes mellitus type 2 in nonobese [facility])    [address], [place], Sara Grimes, DO    Office Visit    1 year ago Type 2 diabetes mellitus without complication, without long-term current use of insulin ([address]    [address], [address] [facility], [place], Louise Pry, DO    Office Visit          Future Appointments         Provider Department Appt Notes    In 6 days Tahir Wren MD [facility], [place] [address] [facility] Low Testosterone Levels (policy informed)    In 1 week Lisa Sofia MD [address] 4 week  kidney stone  f/u    In 2 weeks Dorothy Staley DO [facility], [address], [place] follow up on meds policy informed to pt                    Passed - EGFRCR or GFRNAA > 50     GFR Evaluation  EGFRCR: 96 , resulted on 9/4/2023              Recent Outpatient Visits              2 weeks ago Nephrolithiasis    Carmen Gauthier MD    Office Visit    2 months ago Type 2 diabetes mellitus with hyperglycemia, without long-term current use of insulin ([facility] [address]    [address], [address], [place], [employer]    Office Visit    2 months ago Encounter for screening colonoscopy    [facility], [address], [place], Dorothy Williamson,     Office Visit    10 months ago [facility]    [address] [facility], [address], [place], Dorothy Williamson,     Office Visit    1 year ago Type 2 diabetes mellitus without complication, without long-term current use of insulin ([facility] [address]    [address], [address], [place] Dorothy Staley, DO    Office Visit           Future Appointments         Provider Department Appt Notes    In 6 days [facility] Test[facility] Levels (policy informed)    In 1 week Lisa Sofia MD [address] [facility] [place] 4 week  kidney stone  f/u    In 2 weeks Parviz Miller DO [address], [address], [place] follow up on meds policy informed to pt

## 2023-09-23 RX ORDER — ROSUVASTATIN CALCIUM 20 MG/1
20 TABLET, COATED ORAL NIGHTLY
Qty: 90 TABLET | Refills: 3 | Status: SHIPPED | OUTPATIENT
Start: 2023-09-23

## 2023-09-23 NOTE — TELEPHONE ENCOUNTER
Please review refill protocol failed/ no protocol  Requested Prescriptions   Pending Prescriptions Disp Refills    ROSUVASTATIN 20 MG Oral Tab [Pharmacy Med Name: ROSUVASTATIN CALCIUM 20 MG TAB] 30 tablet 0     Sig: TAKE 1 TABLET BY MOUTH EVERY DAY AT NIGHT       Cholesterol Medication Protocol Failed - 9/22/2023  1:34 PM        Failed - LDL in past 12 months        Failed - Last LDL < 130     Lab Results   Component Value Date     (H) 09/30/2019             Passed - ALT in past 12 months        Passed - Last ALT < 80     Lab Results   Component Value Date    ALT 25 09/04/2023             Passed - In person appointment or virtual visit in the past 12 mos or appointment in next 3 mos     Recent Outpatient Visits              2 weeks ago Nephrolithiasis    [facility], [address], Lisandra Matthews MD    Office Visit    2 months ago Type 2 diabetes mellitus with hyperglycemia, without long-term current use of insulin ([facility] [address]    [address], [address], [place] Hennie Heckle, [employer]    Office Visit    3 months ago Encounter for screening colonoscopy    [facility], [address], [place], [facility] [address], DO    Office Visit    10 months ago [facility]    [address], [place], [facility] [address], DO    Office Visit    1 year ago Type 2 diabetes mellitus without complication, without long-term current use of insulin ([facility] [address]    [address], [place], [address], DO    Office Visit          Future Appointments         Provider Department Appt Notes    In 4 days Aquilino Arredondo MD [facility], [place] [address] [facility] Low Testosterone Levels (policy informed)    In 1 week Javan Delgado MD [address] [facility] 4 week  kidney stone  f/u    In 2 weeks Alexa Morales DO [facility], [address], [place] follow up on meds policy informed to pt                      METFORMIN HCL 1000 MG Oral Tab [Pharmacy Med Name: METFORMIN HCL 1,000 MG TABLET] 60 tablet 0     Sig: TAKE 1 TABLET BY MOUTH TWICE A DAY WITH MEALS       Diabetes Medication Protocol Failed - 9/22/2023  1:34 PM        Failed - Last A1C < 7.5 and within past 6 months     Lab Results   Component Value Date    A1C 8.4 (H) 09/30/2019             Passed - In person appointment or virtual visit in the past 6 mos or appointment in next 3 mos     Recent Outpatient Visits              2 weeks ago Nephrolithiasis    [facility], [address], Darcie Valdez MD    Office Visit    2 months ago Type 2 diabetes mellitus with hyperglycemia, without long-term current use of insulin ([address]    Hernan Arthur, [address], [place], [place]    Office Visit    3 months ago Encounter for screening colonoscopy    [facility], [address], [place] Kee Poisson, [address], DO    Office Visit    10 months ago Diabetes mellitus type 2 in nonobese [facility])    [facility], [address], [place] Kee Poisson, [address], DO    Office Visit    1 year ago Type 2 diabetes mellitus without complication, without long-term current use of insulin ([address]    Hubert Jerson, [address], [place], [address], DO    Office Visit          Future Appointments         Provider Department Appt Notes    In 4 days MD Julian [place] Low Testosterone Levels (policy informed)    In 1 week MD Hernan West, [address], [place] 4 week  kidney stone  f/u    In 2 weeks DO Hernan Cordero, [address], [place] follow up on meds policy informed to pt                    Passed - [facility] or GFRNAA > 50     GFR Evaluation  EGFRCR: 96 , resulted on 9/4/2023 Passed - GFR in the past 12 months

## 2023-09-24 ENCOUNTER — TELEPHONE (OUTPATIENT)
Dept: FAMILY MEDICINE CLINIC | Facility: CLINIC | Age: 48
End: 2023-09-24

## 2023-09-25 RX ORDER — EMPAGLIFLOZIN 25 MG/1
1 TABLET, FILM COATED ORAL EVERY MORNING
Qty: 90 TABLET | Refills: 0 | Status: SHIPPED | OUTPATIENT
Start: 2023-09-25

## 2023-09-25 NOTE — TELEPHONE ENCOUNTER
Please review. Protocol failed / Has no protocol. Will call patient to complete labs pended from 11/25/22.     Requested Prescriptions   Pending Prescriptions Disp Refills    JARDIANCE 25 MG Oral Tab [Pharmacy Med Name: Dev Selina 25 MG TABLET] 90 tablet 0     Sig: TAKE 1 TABLET BY MOUTH EVERY DAY IN THE MORNING       Diabetes Medication Protocol Failed - 9/24/2023  7:58 AM        Failed - Last A1C < 7.5 and within past 6 months     Lab Results   Component Value Date    A1C 8.4 (H) 09/30/2019             Passed - In person appointment or virtual visit in the past 6 mos or appointment in next 3 mos     Recent Outpatient Visits              2 weeks ago Nephrolithiasis    [facility], [address], [place], [place]    Office Visit    2 months ago Type 2 diabetes mellitus with hyperglycemia, without long-term current use of insulin ([facility] [address]    [address],[place] [address], [address], [place], [place]    Office Visit    3 months ago Encounter for screening colonoscopy    [facility], [address], [place], Sharon Duran DO    Office Visit    10 months ago Diabetes mellitus type 2 in nonobese [facility])    [address], [place] DO    Office Visit    1 year ago Type 2 diabetes mellitus without complication, without long-term current use of insulin ([facility] [address]    [address], [place] Sharon Staley DO    Office Visit          Future Appointments         Provider Department Appt Notes    In 2 days Aamir Ornelas MD [facility] [place] Low Testosterone Levels (policy informed)    In 1 week Oral MD Tyrone [address] [place] [address] 4 week  kidney stone  f/u    In 1 week Sharon Staley DO [address], [place] follow up on meds policy informed to pt                    Passed - EGFRCR or GFRNAA > 50     GFR Evaluation  EGFRCR: 96 , resulted on 9/4/2023          Passed - GFR in the past 12 months           Future Appointments         Provider Department Appt Notes    In 2 days MD Becca Simon [place] Low Testosterone Levels (policy informed)    In 1 week MD Sandra Mccauley [place] 4 week  kidney stone  f/u    In 1 week DO Camden Romero, [address], [place] follow up on meds policy informed to pt           Recent Outpatient Visits              2 weeks ago Nephrolithiasis    Alden White MD    Office Visit    2 months ago Type 2 diabetes mellitus with hyperglycemia, without long-term current use of insulin ([address]    Camden Leonard, [address], [place] Lynne Hone, [employer]    Office Visit    3 months ago Encounter for screening colonoscopy    [facility], [address], [place], [facility], DO    Office Visit    10 months ago Diabetes mellitus type 2 in nonobese [facility])    Camden Leonard [address], [place], [facility], DO    Office Visit    1 year ago Type 2 diabetes mellitus without complication, without long-term current use of insulin ([address]    Nash Bon[facility], [address] [place] [address], [facility], DO    Office Visit

## 2023-09-25 NOTE — TELEPHONE ENCOUNTER
Called patient using language lines Delmar Valladares ID# [id number]  Left message for patient informing him of this

## 2023-09-25 NOTE — TELEPHONE ENCOUNTER
Please call patient to remind to complete labs.  Needs A1C done (pended 11/2022  & 6/2023)  Thank you

## 2023-09-27 ENCOUNTER — OFFICE VISIT (OUTPATIENT)
Dept: ENDOCRINOLOGY CLINIC | Facility: CLINIC | Age: 48
End: 2023-09-27

## 2023-09-27 VITALS
WEIGHT: 144 LBS | BODY MASS INDEX: 23 KG/M2 | SYSTOLIC BLOOD PRESSURE: 124 MMHG | DIASTOLIC BLOOD PRESSURE: 79 MMHG | HEART RATE: 76 BPM

## 2023-09-27 DIAGNOSIS — E11.65 TYPE 2 DIABETES MELLITUS WITH HYPERGLYCEMIA, WITHOUT LONG-TERM CURRENT USE OF INSULIN (HCC): ICD-10-CM

## 2023-09-27 DIAGNOSIS — E29.1 HYPOGONADISM IN MALE: Primary | ICD-10-CM

## 2023-09-27 PROCEDURE — 99203 OFFICE O/P NEW LOW 30 MIN: CPT | Performed by: INTERNAL MEDICINE

## 2023-09-27 PROCEDURE — 3078F DIAST BP <80 MM HG: CPT | Performed by: INTERNAL MEDICINE

## 2023-09-27 PROCEDURE — 3074F SYST BP LT 130 MM HG: CPT | Performed by: INTERNAL MEDICINE

## 2023-09-30 ENCOUNTER — APPOINTMENT (OUTPATIENT)
Dept: GENERAL RADIOLOGY | Age: 48
End: 2023-09-30
Attending: NURSE PRACTITIONER

## 2023-09-30 ENCOUNTER — HOSPITAL ENCOUNTER (OUTPATIENT)
Age: 48
Discharge: HOME | End: 2023-09-30

## 2023-09-30 ENCOUNTER — LAB ENCOUNTER (OUTPATIENT)
Dept: LAB | Age: 48
End: 2023-09-30
Attending: INTERNAL MEDICINE

## 2023-09-30 VITALS
TEMPERATURE: 99 F | HEART RATE: 85 BPM | SYSTOLIC BLOOD PRESSURE: 142 MMHG | RESPIRATION RATE: 16 BRPM | DIASTOLIC BLOOD PRESSURE: 91 MMHG | OXYGEN SATURATION: 99 %

## 2023-09-30 DIAGNOSIS — S62.639A CLOSED FRACTURE OF TUFT OF DISTAL PHALANX OF FINGER: Primary | ICD-10-CM

## 2023-09-30 PROCEDURE — 73140 X-RAY EXAM OF FINGER(S): CPT | Performed by: NURSE PRACTITIONER

## 2023-09-30 PROCEDURE — 99213 OFFICE O/P EST LOW 20 MIN: CPT

## 2023-09-30 PROCEDURE — 26750 TREAT FINGER FRACTURE EACH: CPT

## 2023-09-30 NOTE — DISCHARGE INSTRUCTIONS
As discussed, you have a fracture at your fingertip, fourth digit of left hand. Wear aluminum splint and bubba tape for at least 2 to 3 weeks. Avoid heavy lifting, twisting, gripping, repetitive movements etc.  Take Tylenol every 4 hours Motrin every 6 hours. You may apply ice 4 times a day for least 15 minutes. Avoid second injury. Follow-up with your primary care doctor for clearance to return to work.

## 2023-09-30 NOTE — ED INITIAL ASSESSMENT (HPI)
Patient arrives ambulatory with c/o left 4th finger pain after a piece of metal fell on it while he was at work yesterday.

## 2023-10-04 ENCOUNTER — HOSPITAL ENCOUNTER (OUTPATIENT)
Age: 48
End: 2023-10-04
Payer: COMMERCIAL

## 2023-10-04 ENCOUNTER — TELEPHONE (OUTPATIENT)
Dept: SURGERY | Facility: CLINIC | Age: 48
End: 2023-10-04

## 2023-10-04 ENCOUNTER — APPOINTMENT (OUTPATIENT)
Dept: OCCUPATIONAL MEDICINE | Age: 48
End: 2023-10-04
Attending: NURSE PRACTITIONER
Payer: COMMERCIAL

## 2023-10-04 NOTE — TELEPHONE ENCOUNTER
Patient no show for appointment  If he has not passed his stone, recommend CT scan to confirm passage. If stone is still present, may need definitive surgery    Please let him know.

## 2023-10-09 ENCOUNTER — MED REC SCAN ONLY (OUTPATIENT)
Dept: FAMILY MEDICINE CLINIC | Facility: CLINIC | Age: 48
End: 2023-10-09

## 2023-10-09 ENCOUNTER — NURSE TRIAGE (OUTPATIENT)
Dept: FAMILY MEDICINE CLINIC | Facility: CLINIC | Age: 48
End: 2023-10-09

## 2023-10-09 NOTE — TELEPHONE ENCOUNTER
Action Requested: Summary for Provider     []  Critical Lab, Recommendations Needed  [] Need Additional Advice  []   FYI    []   Need Orders  [] Need Medications Sent to Pharmacy  []  Other     SUMMARY: per protocol, patient should be seen in office within 3 days. Future Appointments   Date Time Provider Julian Vega   10/10/2023  4:00 PM Zilphia Boas [facility] [place]   10/23/2023  3:00 PM Refugio Baptiste MD [facility]     Reason for call: Anxiety  Onset: Data Unavailable    Language line Kecia Valiente ID # [id number]. Transferred from [address] [place] [address]. Spoke to patient. He has been having a lot of anxiety lately, it has been going on for a few months. They are increasing to daily occurrences and patient's description sounds like a panic attack. He has palpitations, trouble breathing, lightheadedness, chest pain. It lasts for about 40 minutes and will resolve. Her denies any suicidal thoughts. He has a little depression. He said that there is nothing he is aware of that is causing his anxiety. He does not have increased stress. Scheduled appointment for tomorrow. Reason for Disposition   Symptoms of anxiety or panic and has not been evaluated for this by physician    Protocols used:  Anxiety and Panic Attack-A-[place]

## 2023-10-10 ENCOUNTER — LAB ENCOUNTER (OUTPATIENT)
Dept: LAB | Age: 48
End: 2023-10-10
Attending: FAMILY MEDICINE
Payer: COMMERCIAL

## 2023-10-10 ENCOUNTER — OFFICE VISIT (OUTPATIENT)
Dept: FAMILY MEDICINE CLINIC | Facility: CLINIC | Age: 48
End: 2023-10-10

## 2023-10-10 VITALS
SYSTOLIC BLOOD PRESSURE: 113 MMHG | WEIGHT: 140 LBS | HEART RATE: 94 BPM | DIASTOLIC BLOOD PRESSURE: 80 MMHG | BODY MASS INDEX: 23 KG/M2

## 2023-10-10 DIAGNOSIS — Z12.11 ENCOUNTER FOR SCREENING COLONOSCOPY: ICD-10-CM

## 2023-10-10 DIAGNOSIS — F41.0 PANIC ANXIETY SYNDROME: ICD-10-CM

## 2023-10-10 DIAGNOSIS — E11.65 TYPE 2 DIABETES MELLITUS WITH HYPERGLYCEMIA, WITHOUT LONG-TERM CURRENT USE OF INSULIN (HCC): Primary | ICD-10-CM

## 2023-10-10 DIAGNOSIS — E11.65 TYPE 2 DIABETES MELLITUS WITH HYPERGLYCEMIA, WITHOUT LONG-TERM CURRENT USE OF INSULIN (HCC): ICD-10-CM

## 2023-10-10 LAB — COMPLEXED PSA SERPL-MCNC: 0.39 NG/ML (ref ?–4)

## 2023-10-10 PROCEDURE — 36415 COLL VENOUS BLD VENIPUNCTURE: CPT

## 2023-10-10 PROCEDURE — 99214 OFFICE O/P EST MOD 30 MIN: CPT | Performed by: FAMILY MEDICINE

## 2023-10-10 PROCEDURE — 3074F SYST BP LT 130 MM HG: CPT | Performed by: FAMILY MEDICINE

## 2023-10-10 PROCEDURE — 3079F DIAST BP 80-89 MM HG: CPT | Performed by: FAMILY MEDICINE

## 2023-10-10 RX ORDER — ESCITALOPRAM OXALATE 20 MG/1
20 TABLET ORAL DAILY
Qty: 90 TABLET | Refills: 0 | Status: SHIPPED | OUTPATIENT
Start: 2023-10-10 | End: 2024-10-04

## 2023-10-10 RX ORDER — CLONAZEPAM 0.5 MG/1
0.5 TABLET ORAL NIGHTLY PRN
Qty: 30 TABLET | Refills: 0 | Status: SHIPPED | OUTPATIENT
Start: 2023-10-10

## 2023-10-10 NOTE — H&P
[facility], [address], [place]    History and Physical    Armando Mann Patient Status:  Specimen    1975 MRN [id number]   Location [facility], [address], [place] Attending No att. providers found   Hosp Day # 0 PCP Analia Staley DO     Date:  10/10/2023  Date of Admission:  (Not on file)    History provided by:{Persons; family members:[id number]}  HPI:   Patient presents with: Anxiety    HPI    History     Past Medical History:   Diagnosis Date    Diabetes ([facility]     Essential hypertension     Type 2 diabetes mellitus ([facility]      History reviewed. No pertinent surgical history. No family history on file. Social History:  Social History     Socioeconomic History    Marital status: Single   Tobacco Use    Smoking status: Never    Smokeless tobacco: Never   Vaping Use    Vaping Use: Never used   Substance and Sexual Activity    Alcohol use: No    Drug use: No   Other Topics Concern    Right Handed Yes     Allergies/Medications: Allergies: No Known Allergies  (Not in a hospital admission)      Review of Systems:   Review of Systems    Physical Exam:   Vital Signs:  Blood pressure 113/80, pulse 94, weight 140 lb (63.5 kg). Physical Exam      Results:     Lab Results   Component Value Date    WBC 6.3 2023    HGB 14.8 2023    HCT 44.8 2023    .0 2023    CREATSERUM 0.97 2023    BUN 18 2023     2023    K 4.2 2023     2023    CO2 27.0 2023     (H) 2023    CA 9.2 2023    ALB 4.1 2023    ALKPHO 64 2023    BILT 0.9 2023    TP 7.6 2023    AST 17 2023    ALT 25 2023    TSH 0.84 2019    LIP 39 2023    PSA 0.3 2019     No results found. Assessment/Plan:     * No active hospital problems. Roderick Severs B. Sandra Kaplan, DO  10/10/2023

## 2023-10-10 NOTE — PROGRESS NOTES
Blood pressure 113/80, pulse 94, weight 140 lb (63.5 kg). Complaining of anxiety and some panic attacks. Sleeps about 5 hours per night. No drug use no suicidal ideation. Makes it to work every day except yesterday he stated because he was so anxious. About 10 years ago he took Lexapro and alprazolam with success stayed on medication for about a year now he feels better. He is interested in counseling. He is also open to medication use. Objective    Patient is comfortable no apparent distress appropriate mood and affect    Assessment #1 anxiety with panic #2 diabetes    Plan #1 behavioral health navigator contacted patient is Citizen of [place]-speaking also Lexapro 20 mg daily and clonazepam as needed    MEDICATION MAY CAUSE DROWSINESS. DO NOT DRIVE OR OPERATE HEAVY MACHINERY.         #2  Diabetic eye exam order given    Colonoscopy information given    Patient is to follow-up with me in 3 weeks also PSA blood test ordered

## 2023-10-13 ENCOUNTER — TELEPHONE (OUTPATIENT)
Dept: FAMILY MEDICINE CLINIC | Facility: CLINIC | Age: 48
End: 2023-10-13

## 2023-10-13 NOTE — TELEPHONE ENCOUNTER
----- Message From: Ike Edwards DO Sent: 10/12/2023 4:07 PM CDT---    Prostate level normal.  Please locate patient blood test results done at [address] [place] [address].

## 2023-10-13 NOTE — TELEPHONE ENCOUNTER
Called [facility] and they stated there are no results for patient on their end. No [facility] orders found in patient chart, only orders placed are for [facility]. Please advise. Patient preferred lab is [facility] lab.

## 2023-10-14 NOTE — TELEPHONE ENCOUNTER
Language Line utilized.  ID # [id number], [address].      First Call attempt. Left Voicemail to call back our office. Office phone number provided with office hours.

## 2023-10-16 RX ORDER — EZETIMIBE 10 MG/1
10 TABLET ORAL DAILY
Qty: 90 TABLET | Refills: 3 | Status: SHIPPED | OUTPATIENT
Start: 2023-10-16 | End: 2024-10-10

## 2023-10-16 NOTE — TELEPHONE ENCOUNTER
Language line Robert Beckham ID # [id number], Identified patient's name and . He said that he goes to [facility] in [facility]. Their number is [phone]. Rn called and they will refax the recent labs to [phone]. Dr. Senthil Motta.

## 2023-10-16 NOTE — TELEPHONE ENCOUNTER
2nd call attempt: with . His sister Shelly Carlson answered however, she is not on Y[facility]LYN. She is on the chart. I advised her to please have patient return a call to our office.

## 2023-10-17 NOTE — TELEPHONE ENCOUNTER
Called pt and left a brief VM regarding message below.  If pt has more questions, also stated to give us a call back as well if further questions

## 2023-10-23 ENCOUNTER — OFFICE VISIT (OUTPATIENT)
Dept: SURGERY | Facility: CLINIC | Age: 48
End: 2023-10-23

## 2023-10-23 DIAGNOSIS — N20.0 NEPHROLITHIASIS: Primary | ICD-10-CM

## 2023-10-23 PROCEDURE — 99213 OFFICE O/P EST LOW 20 MIN: CPT | Performed by: UROLOGY

## 2023-10-23 NOTE — PROGRESS NOTES
Carolnie Munroe MD  Department of Urology  [facility] [place]    T: [phone]  F: [phone]    To: Rolan Joaquin DO   [address] [place] [address] Dr Muñoz: Aurelia Contreras   MRN: [id number]  : 1975    Dear Dwain Hassan. DO Luís,    Today I had the pleasure of seeing Aurelia Contreras in my clinic. As you know, Mr. Bijan Díaz is a pleasant 46year old year old male who I am seeing for nephrolithiasis. Patient was last seen in this department on 2023. Briefly, robles went to the ER on 2023 with flank pain. He was noted to have a 4mm left UVJ stone. Denies fevers, chills, nausea, vomiting. He has not caught the stone       PAST MEDICAL HISTORY:  Past Medical History:   Diagnosis Date    Diabetes ([facility] [address]     Essential hypertension     Type 2 diabetes mellitus ([facility] [address]         PAST SURGICAL HISTORY:  No past surgical history on file. ALLERGIES:  No Known Allergies      MEDICATIONS:  Current Outpatient Medications   Medication Instructions    clonazePAM (KLONOPIN) 0.5 mg, Oral, Nightly PRN    Empagliflozin (JARDIANCE) 25 MG Oral Tab 1 tablet, Oral, Every morning    escitalopram (LEXAPRO) 20 mg, Oral, Daily    ezetimibe (ZETIA) 10 mg, Oral, Daily    ibuprofen (MOTRIN) 600 mg, Oral, Every 8 hours PRN    lisinopril (ZESTRIL) 10 mg, Oral, Daily    metFORMIN HCl (GLUCOPHAGE) 1,000 mg, Oral, 2 times daily with meals    Ozempic (0.25 or 0.5 MG/DOSE) 0.5 mg, Subcutaneous, Weekly    rosuvastatin (CRESTOR) 20 mg, Oral, Nightly    tamsulosin (FLOMAX) 0.4 mg, Oral, Daily, Take 1/2 hour following the same meal each day        FAMILY HISTORY:  No family history on file.      SOCIAL HISTORY:  Social History     Socioeconomic History    Marital status: Single   Tobacco Use    Smoking status: Never    Smokeless tobacco: Never   Vaping Use    Vaping Use: Never used   Substance and Sexual Activity    Alcohol use: No    Drug use: No   Other Topics Concern    Right Handed Yes          PHYSICAL EXAMINATION:  There were no vitals filed for this visit. CONSTITUTIONAL: No apparent distress, cooperative and communicative  NEUROLOGIC: Alert and oriented   HEAD: Normocephalic, atraumatic   EYES: Sclera non-icteric   ENT: Hearing intact, moist mucous membranes   NECK: No obvious goiter or masses   RESPIRATORY: Normal respiratory effort, Nonlabored breathing on room air  SKIN: No evident rashes   ABDOMEN: Soft, nontender, nondistended, no rebound tenderness, no guarding, no masses      REVIEW OF SYSTEMS:    A comprehensive 10-point review of systems was completed. Pertinent positives and negatives are noted in the the HPI. LABORATORY DATA:      Component  Ref Range & Units 10/10/23  4:35 PM   Prostate Specific Antigen Screen  <=4.00 ng/mL 0.39              IMAGING REVIEW:  Narrative   PROCEDURE:   CT ABDOMEN+PELVIS(CPT=74176)     COMPARISON:   None. INDICATIONS:   left flank pain     TECHNIQUE: CT images of the abdomen and pelvis were obtained without intravenous contrast material.  Automated exposure control for dose reduction was used. Adjustment of the mA and/or kV was done based on the patient's size. Use of iterative  reconstruction technique for dose reduction was used. Dose information is transmitted to the [facility] of Radiology) NRDR ([address] [place] [address] Rd) which includes the Dose Index Registry. FINDINGS:  Normal lower chest     Normal liver and gallbladder     Normal pancreas and spleen     Normal adrenals     4 millimeter stone at the left UVJ causes mild hydronephrosis and fat stranding. No other calculi. Normal right kidney     Normal size aorta     Normal bowel loops including appendix. No free fluid or air     Pelvis shows normal bladder     No fractures or bone lesion.                  Impression   CONCLUSION: 4 millimeter left UVJ stone causing mild upstream hydronephrosis           Dictated by (CST): Srikanth Ruby Esqueda MD on 9/04/2023 at 10:14 AM      Finalized by (CST): Ivan Loco MD on 9/04/2023 at 10:16 AM              OTHER RELEVANT DATA:   none     IMPRESSION: 4mm distal left uvj stone, unclear if passed although he is not having any pain. Will plan for Ct noncontrast     PLAN:  CT noncontrast -if no stone present we will let him know by phone call. If the stone is still there he will need to return to clinic to be set up for ureteroscopy    Thank you for referring this very pleasant patient to my clinic. If you have any questions or concerns, please do not hesitate to contact me. Sincerely,  Amanda Nolasco MD    20 minutes were spent on this patient at this visit obtaining a history, reviewing medical records, developing a treatment plan, counseling and discussing treatment strategy with patient, coordination of care and documentation. The [facility] makes medical notes available to patients in the interest of transparency. However, please be advised that this is a medical document. It is intended as a peer to peer communication. It is written in medical language and may contain abbreviations or verbiage that are technical and unfamiliar. It may appear blunt or direct. Medical documents are intended to carry relevant information, facts as evident, and the clinical opinion of the practitioner.

## 2023-11-06 RX ORDER — EMPAGLIFLOZIN 25 MG/1
1 TABLET, FILM COATED ORAL EVERY MORNING
Qty: 90 TABLET | Refills: 0 | OUTPATIENT
Start: 2023-11-06

## 2023-11-06 RX ORDER — TRAZODONE HYDROCHLORIDE 50 MG/1
50 TABLET ORAL NIGHTLY
Qty: 90 TABLET | Refills: 0 | OUTPATIENT
Start: 2023-11-06

## 2023-11-06 RX ORDER — ESCITALOPRAM OXALATE 20 MG/1
20 TABLET ORAL DAILY
Qty: 90 TABLET | Refills: 0 | OUTPATIENT
Start: 2023-11-06

## 2023-11-11 RX ORDER — CLONAZEPAM 0.5 MG/1
0.5 TABLET ORAL NIGHTLY PRN
Qty: 20 TABLET | Refills: 0 | Status: SHIPPED | OUTPATIENT
Start: 2023-11-11

## 2023-11-11 RX ORDER — ESCITALOPRAM OXALATE 20 MG/1
20 TABLET ORAL DAILY
Qty: 90 TABLET | Refills: 0 | OUTPATIENT
Start: 2023-11-11

## 2023-11-11 NOTE — TELEPHONE ENCOUNTER
Escitalopram was filled 10/10/23 for 90 tabs. Review pended refill request as it does not fall under a protocol.     Last Rx: 10/10/23  LOV: 10/10/23

## 2023-11-15 ENCOUNTER — TELEPHONE (OUTPATIENT)
Dept: FAMILY MEDICINE CLINIC | Facility: CLINIC | Age: 48
End: 2023-11-15

## 2023-11-15 NOTE — TELEPHONE ENCOUNTER
Received a fax from release point requesting medical records for pt  I faxed over documents to our medical depart. Sent a copy to scanning department.

## 2023-11-19 ENCOUNTER — HOSPITAL ENCOUNTER (OUTPATIENT)
Age: 48
Discharge: HOME | End: 2023-11-19
Attending: EMERGENCY MEDICINE

## 2023-11-19 VITALS
SYSTOLIC BLOOD PRESSURE: 133 MMHG | OXYGEN SATURATION: 98 % | HEART RATE: 88 BPM | TEMPERATURE: 99 F | RESPIRATION RATE: 18 BRPM | DIASTOLIC BLOOD PRESSURE: 88 MMHG

## 2023-11-19 DIAGNOSIS — S62.665S: Primary | ICD-10-CM

## 2023-11-19 PROCEDURE — 99212 OFFICE O/P EST SF 10 MIN: CPT

## 2023-11-27 RX ORDER — ESCITALOPRAM OXALATE 20 MG/1
20 TABLET ORAL DAILY
Qty: 90 TABLET | Refills: 2 | Status: SHIPPED | OUTPATIENT
Start: 2023-11-27

## 2023-11-27 NOTE — TELEPHONE ENCOUNTER
Refill passed per [facility] protocol. Please review pended refill request as unable to refill due to high/very high drug interaction warning copied here:  High  Drug-Drug: ibuprofen and escitalopramToxic effects may be increased with concurrent administration of ibuprofen and Selective Serotonin Reuptake Inhibitors. The risk of upper gastrointestinal bleeding may be increased. Patients taking both drugs concurrently should be educated about the signs and symptoms of GI bleeding.     Requested Prescriptions   Pending Prescriptions Disp Refills    ESCITALOPRAM 20 MG Oral Tab [Pharmacy Med Name: ESCITALOPRAM 20 MG TABLET] 90 tablet 0     Sig: TAKE 1 TABLET BY MOUTH EVERY DAY       Psychiatric Non-Scheduled (Anti-Anxiety) Passed - 11/26/2023 10:36 AM        Passed - In person appointment or virtual visit in the past 6 mos or appointment in next 3 mos     Recent Outpatient Visits              1 month ago Nephrolithiasis    [facility], [address], Tarny Melendez MD    Office Visit    1 month ago Type 2 diabetes mellitus with hyperglycemia, without long-term current use of insulin ([facility] [address]    [address], [place], Laura Vazquez DO    Office Visit    2 months ago Hypogonadism in male    [address] [facility], Chhaya Alvarado MD    Office Visit    2 months ago Nephrolithiasis    [facility], [address], Taryn Melendez MD    Office Visit    5 months ago Type 2 diabetes mellitus with hyperglycemia, without long-term current use of insulin ([facility] [address]    [address], [address], [place], [employer]    Office Visit          Future Appointments         Provider Department Appt Notes    In 5 days Sharon Perez DO [address],[place] [address], [address], [place] Diabetes follow-up                  Future Appointments         Provider Department Appt Notes In 5 days Hugo Mata DO [facility], [address], [place] Diabetes follow-up          Recent Outpatient Visits              1 month [facility], [address], [place], MD    Office Visit    1 month ago Type 2 diabetes mellitus with hyperglycemia, without long-term current use of insulin ([address]    [address], [place] Sharon Joaquin DO    Office Visit    2 months ago Hypogonadism in male    [address] [facility], Radha Ronquillo MD    Office Visit    2 months ago [facility]    [facility], [address], [place], MD    Office Visit    5 months ago Type 2 diabetes mellitus with hyperglycemia, without long-term current use of insulin [facility])    [address], [address], [place], [place]    Office Visit

## 2023-12-02 ENCOUNTER — OFFICE VISIT (OUTPATIENT)
Dept: FAMILY MEDICINE CLINIC | Facility: CLINIC | Age: 48
End: 2023-12-02

## 2023-12-02 VITALS
SYSTOLIC BLOOD PRESSURE: 115 MMHG | HEART RATE: 85 BPM | DIASTOLIC BLOOD PRESSURE: 76 MMHG | BODY MASS INDEX: 22 KG/M2 | WEIGHT: 139 LBS | OXYGEN SATURATION: 97 %

## 2023-12-02 DIAGNOSIS — E11.65 TYPE 2 DIABETES MELLITUS WITH HYPERGLYCEMIA, WITHOUT LONG-TERM CURRENT USE OF INSULIN (HCC): Primary | ICD-10-CM

## 2023-12-02 DIAGNOSIS — Z12.11 ENCOUNTER FOR SCREENING COLONOSCOPY: ICD-10-CM

## 2023-12-02 PROCEDURE — 3074F SYST BP LT 130 MM HG: CPT | Performed by: FAMILY MEDICINE

## 2023-12-02 PROCEDURE — 3078F DIAST BP <80 MM HG: CPT | Performed by: FAMILY MEDICINE

## 2023-12-02 PROCEDURE — 99213 OFFICE O/P EST LOW 20 MIN: CPT | Performed by: FAMILY MEDICINE

## 2023-12-02 PROCEDURE — 90686 IIV4 VACC NO PRSV 0.5 ML IM: CPT | Performed by: FAMILY MEDICINE

## 2023-12-02 PROCEDURE — 90471 IMMUNIZATION ADMIN: CPT | Performed by: FAMILY MEDICINE

## 2023-12-02 NOTE — PROGRESS NOTES
Blood pressure 115/76, pulse 85, weight 139 lb (63 kg), SpO2 97%. 1 week history of nocturnal cough with green phlegm. No nocturnal cough last night some clear nasal discharge no sore throat. No fever no chills. No difficulty breathing and no asthma. No smoking no headache. No bad breath or tooth pain. No facial pain or pressure. No ear pain. Using DayQuil and ibuprofen. Losing weight using Ozempic. Follows up with endocrinology for diabetes. Also following up for anxiety reports he feels much better sleeping well. Goes to work daily denies suicidal ideation no drug use. Objective    Patient is comfortable no apparent distress    Assessment cough with green phlegm also history of anxiety and diabetes    Plan continue medications follow-up with me in 3 months    Gastroenterology referral entered also diabetic eye exam order given.

## 2023-12-26 RX ORDER — LISINOPRIL 10 MG/1
10 TABLET ORAL DAILY
Qty: 90 TABLET | Refills: 3 | Status: SHIPPED | OUTPATIENT
Start: 2023-12-26

## 2023-12-26 NOTE — TELEPHONE ENCOUNTER
Refill passed per [facility] [place] protocol.     Requested Prescriptions   Pending Prescriptions Disp Refills    LISINOPRIL 10 MG Oral Tab [Pharmacy Med Name: LISINOPRIL 10 MG TABLET] 90 tablet 0     Sig: TAKE 1 TABLET BY MOUTH EVERY DAY       Hypertensive Medications Protocol Passed - 12/24/2023  7:39 AM        Passed - In person appointment in the past 12 or next 3 months     Recent Outpatient Visits              3 weeks ago Type 2 diabetes mellitus with hyperglycemia, without long-term current use of insulin ([facility] [address]    [facility], [address], [place], Laura Vazquez DO    Office Visit    2 months ago Nephrolithiasis    [facility], [address], Taryn Melendez MD    Office Visit    2 months ago Type 2 diabetes mellitus with hyperglycemia, without long-term current use of insulin ([facility] [address]    [facility], [address], [place], Laura Vazquez,     Office Visit    3 months ago Hypogonadism in male    [address] [facility] [address], [address] [place], [place] MD ALEXSANDRA    Office Visit    3 months ago Nephrolithiasis    [facility], [address], Taryn Melendez MD    Office Visit                      Passed - Last BP reading less than 140/90     BP Readings from Last 1 Encounters:   12/02/23 115/76               Passed - CMP or BMP in past 6 months     Recent Results (from the past 4392 hour(s))   Comp Metabolic Panel (14)    Collection Time: 09/04/23  9:10 AM   Result Value Ref Range    Glucose 247 (H) 70 - 99 mg/dL    Sodium 137 136 - 145 mmol/L    Potassium 4.2 3.5 - 5.1 mmol/L    Chloride 103 98 - 112 mmol/L    CO2 27.0 21.0 - 32.0 mmol/L    Anion Gap 7 0 - 18 mmol/L    BUN 18 7 - 18 mg/dL    Creatinine 0.97 0.70 - 1.30 mg/dL    BUN/CREA Ratio 18.6 10.0 - 20.0    Calcium, Total 9.2 8.5 - 10.1 mg/dL    Calculated Osmolality 294 275 - 295 mOsm/kg    eGFR-Cr 96 >=60 mL/min/1.73m2    ALT 25 16 - 61 U/L    AST 17 15 - 37 U/L    Alkaline Phosphatase 64 45 - 117 U/L    Bilirubin, Total 0.9 0.1 - 2.0 mg/dL    Total Protein 7.6 6.4 - 8.2 g/dL    Albumin 4.1 3.4 - 5.0 g/dL    Globulin  3.5 2.8 - 4.4 g/dL    A/G Ratio 1.2 1.0 - 2.0     *Note: Due to a large number of results and/or encounters for the requested time period, some results have not been displayed. A complete set of results can be found in Results Review.                Passed - In person appointment or virtual visit in the past 6 months     Recent Outpatient Visits              3 weeks ago Type 2 diabetes mellitus with hyperglycemia, without long-term current use of insulin ([address]    [address], [address], [place] Palomino[place], DO    Office Visit    2 months ago Nephrolithiasis    Ghazal Samano MD    Office Visit    2 months ago Type 2 diabetes mellitus with hyperglycemia, without long-term current use of insulin ([address]    [facility], [address], [place] Palom[place], DO    Office Visit    3 months ago Hypogonadism in male    Baljinder Osborn MD    Office Visit    3 months ago Nephrolithiasis    [address] [place], [place] [address]Jerilyn MD    Office Visit                      Passed - EGFRCR or GFRNAA > 50     GFR Evaluation  EGFRCR: 96 , resulted on 9/4/2023               Recent Outpatient Visits              3 weeks ago Type 2 diabetes mellitus with hyperglycemia, without long-term current use of insulin ([address]    [address], [address], [place] Palom[place], DO    Office Visit    2 months ago [facility]    Office Visit    2 months ago Type 2 diabetes mellitus with hyperglycemia, without long-term current use of insulin ([address]    [facility] [address], [place] Jemal Ewellshelbi Gandhi DO    Office Visit    3 months ago [facility] Ren[facility]    Office Visit    3 months ago Nephrolithiasis    [address], [address] [facility],[address], [facility]    Office Visit

## 2024-04-10 ENCOUNTER — TELEPHONE (OUTPATIENT)
Dept: FAMILY MEDICINE CLINIC | Facility: CLINIC | Age: 49
End: 2024-04-10

## 2024-06-07 ENCOUNTER — HOSPITAL ENCOUNTER (OUTPATIENT)
Age: 49
Discharge: HOME | End: 2024-06-07
Attending: EMERGENCY MEDICINE
Payer: OTHER MISCELLANEOUS

## 2024-06-07 ENCOUNTER — APPOINTMENT (OUTPATIENT)
Dept: GENERAL RADIOLOGY | Age: 49
End: 2024-06-07
Attending: EMERGENCY MEDICINE
Payer: OTHER MISCELLANEOUS

## 2024-06-07 VITALS
OXYGEN SATURATION: 98 % | SYSTOLIC BLOOD PRESSURE: 142 MMHG | HEART RATE: 66 BPM | DIASTOLIC BLOOD PRESSURE: 81 MMHG | RESPIRATION RATE: 18 BRPM | TEMPERATURE: 97 F

## 2024-06-07 DIAGNOSIS — S62.524B OPEN NONDISPLACED FRACTURE OF DISTAL PHALANX OF RIGHT THUMB, INITIAL ENCOUNTER: Primary | ICD-10-CM

## 2024-06-07 PROCEDURE — 99214 OFFICE O/P EST MOD 30 MIN: CPT

## 2024-06-07 PROCEDURE — 26750 TREAT FINGER FRACTURE EACH: CPT

## 2024-06-07 PROCEDURE — 90471 IMMUNIZATION ADMIN: CPT

## 2024-06-07 PROCEDURE — 12001 RPR S/N/AX/GEN/TRNK 2.5CM/<: CPT

## 2024-06-07 PROCEDURE — 99213 OFFICE O/P EST LOW 20 MIN: CPT

## 2024-06-07 PROCEDURE — 73140 X-RAY EXAM OF FINGER(S): CPT | Performed by: EMERGENCY MEDICINE

## 2024-06-07 RX ORDER — HYDROCODONE BITARTRATE AND ACETAMINOPHEN 5; 325 MG/1; MG/1
1 TABLET ORAL EVERY 4 HOURS PRN
Qty: 10 TABLET | Refills: 0 | Status: SHIPPED | OUTPATIENT
Start: 2024-06-07

## 2024-06-07 RX ORDER — AMOXICILLIN AND CLAVULANATE POTASSIUM 875; 125 MG/1; MG/1
1 TABLET, FILM COATED ORAL 2 TIMES DAILY
Qty: 14 TABLET | Refills: 0 | Status: SHIPPED | OUTPATIENT
Start: 2024-06-07 | End: 2024-06-14

## 2024-06-07 NOTE — ED PROVIDER NOTES
Patient Seen in: [facility] [place]      History     Chief Complaint   Patient presents with    Worker's Comp     Stated Complaint: WC- fall;and laceration to hand    Subjective:     48-year-old male presents today for evaluation right thumb injury.  Patient reports he was at work when he fell sustaining a laceration to his right thumb and an abrasion to his right buttocks.  No head injury.  No loss of consciousness.  No neck pain.  No numbness, tingling, weakness.  Unknown last tetanus shot.  Symptoms are acute and mild.  Right-hand-dominant.    Objective:   Past Medical History:    Diabetes (HCC)    Essential hypertension    Type 2 diabetes mellitus (HCC)              History reviewed. No pertinent surgical history.             Social History     Socioeconomic History    Marital status: Single   Tobacco Use    Smoking status: Never    Smokeless tobacco: Never   Vaping Use    Vaping status: Never Used   Substance and Sexual Activity    Alcohol use: No    Drug use: No   Other Topics Concern    Right Handed Yes                Physical Exam     ED Triage Vitals [06/07/24 0848]   /81   Pulse 66   Resp 18   Temp 97.4 °F (36.3 °C)   Temp src Temporal   SpO2 98 %   O2 Device None (Room air)       Current:/81   Pulse 66   Temp 97.4 °F (36.3 °C) (Temporal)   Resp 18   SpO2 98%         Physical Exam  Vitals reviewed.   Constitutional:       General: He is not in acute distress.     Appearance: Normal appearance. He is not ill-appearing or toxic-appearing.   HENT:      Head: Normocephalic and atraumatic.      Mouth/Throat:      Mouth: Mucous membranes are moist.      Pharynx: No pharyngeal swelling.   Eyes:      Extraocular Movements: Extraocular movements intact.      Conjunctiva/sclera: Conjunctivae normal.   Cardiovascular:      Rate and Rhythm: Normal rate and regular rhythm.   Pulmonary:      Effort: Pulmonary effort is normal.      Breath sounds: Normal breath sounds.   Musculoskeletal:      Right  wrist: Normal.      Right hand: Laceration present. No tenderness or bony tenderness. Normal strength. Normal sensation. There is no disruption of two-point discrimination. Normal capillary refill. Normal pulse.        Hands:       Cervical back: Neck supple.        Legs:    Skin:     General: Skin is warm and dry.   Neurological:      Mental Status: He is alert and oriented to person, place, and time.   Psychiatric:         Mood and Affect: Mood normal.         ED Course   Labs Reviewed - No data to display  Imaging:  XR FINGER(S) (MIN 2 VIEWS), RIGHT THUMB (CPT=73140)    Result Date: 6/7/2024  CONCLUSION:  Moderately impacted fracture of the distal metadiaphysis of the first metacarpal, with associated soft tissue swelling.  No radiopaque foreign body.   Mild first metacarpophalangeal joint osteoarthritis.    Dictated by (CST): Lisha Ruelas MD on 6/07/2024 at 9:05 AM     Finalized by (CST): Lisha Ruelas MD on 6/07/2024 at 9:07 AM           ED Course as of 06/07/24 0945  ------------------------------------------------------------  Time: 06/07 0940  Comment: Suture repair:  Length: 2 cm  Shape: Linear  Depth: subcutaneous  Details:  Clean  Anesthesia: 4 cc 1% lidocaine local  Preparation:  Sterile field  Irrigation: Copious   Debridement:  None  Skin closure: 5X 5-0 Prolene  Complexity:  Single layer  Postprocedure exam:  Circulation motor and sensation intact  Complications:  None  Patient tolerated:  Well  Performed by myself  Total time:  15 min  ------------------------------------------------------------  Time: 06/07 0941  Comment: Discussed with .  Agree with irrigation, laceration repair, antibiotics, splint and will follow-up on Monday.            MDM        48 year old male with right thumb laceration after fall.  Will check imaging and suture.    Differential diagnosis (including but not limited to):  Laceration, fracture, contusion, sprain, strain    ED course:  Pulse Ox: 98% on room  air which is normal      Comment: Please note this report has been produced using speech recognition software and may contain errors related to that system including errors in grammar, punctuation, and spelling, as well as words and phrases that may be inappropriate. If there are any questions or concerns please feel free to contact the dictating provider for clarification.                                     Medical Decision Making      Disposition and Plan     Clinical Impression:  1. Open nondisplaced fracture of distal phalanx of right thumb, initial encounter         Disposition:  Discharge  6/7/2024  9:44 am    Follow-up:  Sravan Martins MD  [address] [place] [id number]  [phone]    Schedule an appointment as soon as possible for a visit   This is a hand specialist.  Call today for an appointment on Monday.          Medications Prescribed:  Current Discharge Medication List        START taking these medications    Details   amoxicillin clavulanate 875-125 MG Oral Tab Take 1 tablet by mouth 2 (two) times daily for 7 days.  Qty: 14 tablet, Refills: 0      HYDROcodone-acetaminophen 5-325 MG Oral Tab Take 1 tablet by mouth every 4 (four) hours as needed for Pain.  Qty: 10 tablet, Refills: 0    Associated Diagnoses: Open nondisplaced fracture of distal phalanx of right thumb, initial encounter                                    Joseluis Andrade MD  6/7/2024  9:06 AM

## 2024-06-07 NOTE — DISCHARGE INSTRUCTIONS
Thank you for visiting our immediate care for your health care needs.  Please follow up with hand doctor as referred on Monday.  Please call when you get home for an appointment.  If you have any additional problems please return to the immediate care.  Take Norco as prescribed. Please do not drink alcohol, drive, or operate heavy machinery on Norco. It can also make you constipated. You can take Colace over-the-counter to help prevent that.   Did not take additional acetaminophen with Norco. It is OK to take ibuprofen with norco.

## 2024-06-07 NOTE — ED NOTES
Pt called back for thumb spica ocl instead of wrist velcro  splint, pt came back , r thumb spica ocl placed  by josh wilkinson, wound padded, cms intact

## 2024-06-07 NOTE — ED INITIAL ASSESSMENT (HPI)
S/p fall while at work today, c/o r thumb laceration with abrasion to r butt cheek, no loc, no neck or back pain, cms intact

## 2024-06-10 ENCOUNTER — OFFICE VISIT (OUTPATIENT)
Dept: SURGERY | Facility: CLINIC | Age: 49
End: 2024-06-10

## 2024-06-10 ENCOUNTER — OFFICE VISIT (OUTPATIENT)
Dept: SURGERY | Facility: CLINIC | Age: 49
End: 2024-06-10
Payer: COMMERCIAL

## 2024-06-10 DIAGNOSIS — S62.254A CLOSED NONDISPLACED FRACTURE OF NECK OF FIRST METACARPAL BONE OF RIGHT HAND, INITIAL ENCOUNTER: Primary | ICD-10-CM

## 2024-06-10 PROCEDURE — 99204 OFFICE O/P NEW MOD 45 MIN: CPT | Performed by: PLASTIC SURGERY

## 2024-06-10 PROCEDURE — 29105 APPLICATION LONG ARM SPLINT: CPT | Performed by: OCCUPATIONAL THERAPIST

## 2024-06-10 NOTE — PROGRESS NOTES
Per Dr Seals, washed pt's R TH and hand at sink w/ soap and water, applied Polysporin, carlita, Spandage.  Pt given extra dressing supplies for home.  Escorted pt to OT for splint.  RN-SR/OT appt for 6/19/24.  OT appts scheduled twice weekly until OT/MD FU on 7/15/24.  Pt verbalized understanding.   27-Feb-2019 23:32

## 2024-06-11 NOTE — PROGRESS NOTES
Subjective: I hurt my left hand at work.      Objective:     Current level of performance:  ADL: Assistance PRN  Work: No work involving the left hand.  Leisure: Not addressed    Measurements/Tests:  ROM:         N/A         Treatment Provided this day: Fabricated a left thumb spica splint per order.    Treatment Time: 30 minutes      Summary/Analysis of Treatment session: Tolerated the fabrication of a left thumb spica splint well.      Plan: To be compliant with the wear and care of left thumb spica splint x 5 weeks.      Follow up in:  x 3 weeks.          Ken Enriquez  OTR/L

## 2024-06-19 ENCOUNTER — NURSE ONLY (OUTPATIENT)
Dept: SURGERY | Facility: CLINIC | Age: 49
End: 2024-06-19

## 2024-06-19 DIAGNOSIS — Z48.02 ENCOUNTER FOR REMOVAL OF SUTURES: Primary | ICD-10-CM

## 2024-06-19 DIAGNOSIS — S62.254A CLOSED NONDISPLACED FRACTURE OF NECK OF FIRST METACARPAL BONE OF RIGHT HAND, INITIAL ENCOUNTER: ICD-10-CM

## 2024-06-19 NOTE — PROGRESS NOTES
Injury 1: L TH DP comminuted,nondisplaced fracture  - Date: 09/26/19  - Days Since: 1728    Injury 2: R TH MC neck fracture, impacted, nondisplaced  - Date: 06/07/24  - Days Since: 12    Pt here for suture/staple removal to right thumb  Pt identified w/2 identifiers and orders verified.  Pt presents w/gauze spandage and splint C/D/I.  Pt denies s/s infection.  Pt complains of pain 4/10 taking over the counter tylenol that is somewhat helpful  Dressing and splint removed carefully  Sutures C/D/I.  Incision appears to be healing well, edges well approximated.  Sutures removed without difficulty and pt tolerated procedure well.  Site cleansed w/soap and water and escorted to OT  Pt reminded of f/u appt w/OT on 6/27 then appt with OT followed by MD 7/15.  Pt instructed to call the office w/any further questions and/or concerns.  Dr. Seals notified.      RT

## 2024-06-25 ENCOUNTER — OFFICE VISIT (OUTPATIENT)
Dept: SURGERY | Facility: CLINIC | Age: 49
End: 2024-06-25

## 2024-06-25 DIAGNOSIS — M25.641 JOINT STIFFNESS OF HAND, RIGHT: ICD-10-CM

## 2024-06-25 DIAGNOSIS — M62.81 DISTAL MUSCLE WEAKNESS: Primary | ICD-10-CM

## 2024-06-25 PROCEDURE — 97110 THERAPEUTIC EXERCISES: CPT | Performed by: OCCUPATIONAL THERAPIST

## 2024-06-25 NOTE — PROGRESS NOTES
Subjective: I am doing much better.      Objective:     Current level of performance:  ADL: Independent  Work: No work involving the right hand.  Leisure: Not addressed    Measurements/Tests:  ROM:  Testing By: mary  MP Thumb Right: 60  IP Thumb Right: 50  Edema Thumb Right: 110 degrees of MORRIS: without discomfort.             Treatment Provided this day: Therapeutic exercise: AROM:   X 20 reps per set, x 5 sets daily:    Tendon glides:  X 10 reps per set, x 8 sets daily    Reviewed cold cream massage of the right thumb sutured site:     HEP:     Treatment Time: 25 minutes      Summary/Analysis of Treatment session: Excellent progression with OT goals and objectives.      Plan: Full functional use of the right hand by 07/15/2024.      Follow up in:  07/15/2024          Ken Enriquez  OTR/TIMBO

## 2024-07-09 ENCOUNTER — APPOINTMENT (OUTPATIENT)
Dept: GENERAL RADIOLOGY | Age: 49
End: 2024-07-09
Attending: STUDENT IN AN ORGANIZED HEALTH CARE EDUCATION/TRAINING PROGRAM
Payer: COMMERCIAL

## 2024-07-09 ENCOUNTER — TELEPHONE (OUTPATIENT)
Dept: SURGERY | Facility: CLINIC | Age: 49
End: 2024-07-09

## 2024-07-09 ENCOUNTER — HOSPITAL ENCOUNTER (OUTPATIENT)
Age: 49
Discharge: HOME | End: 2024-07-09
Attending: STUDENT IN AN ORGANIZED HEALTH CARE EDUCATION/TRAINING PROGRAM
Payer: COMMERCIAL

## 2024-07-09 VITALS
TEMPERATURE: 98 F | DIASTOLIC BLOOD PRESSURE: 86 MMHG | HEART RATE: 83 BPM | OXYGEN SATURATION: 96 % | SYSTOLIC BLOOD PRESSURE: 127 MMHG | RESPIRATION RATE: 18 BRPM

## 2024-07-09 DIAGNOSIS — J06.9 VIRAL URI WITH COUGH: Primary | ICD-10-CM

## 2024-07-09 DIAGNOSIS — H10.32 ACUTE BACTERIAL CONJUNCTIVITIS OF LEFT EYE: ICD-10-CM

## 2024-07-09 PROCEDURE — 99214 OFFICE O/P EST MOD 30 MIN: CPT

## 2024-07-09 PROCEDURE — 71046 X-RAY EXAM CHEST 2 VIEWS: CPT | Performed by: STUDENT IN AN ORGANIZED HEALTH CARE EDUCATION/TRAINING PROGRAM

## 2024-07-09 PROCEDURE — 99213 OFFICE O/P EST LOW 20 MIN: CPT

## 2024-07-09 RX ORDER — POLYMYXIN B SULFATE AND TRIMETHOPRIM 1; 10000 MG/ML; [USP'U]/ML
SOLUTION OPHTHALMIC
Qty: 10 ML | Refills: 0 | Status: SHIPPED | OUTPATIENT
Start: 2024-07-09

## 2024-07-09 NOTE — ED INITIAL ASSESSMENT (HPI)
Patient arrived ambulatory to room c/o symptoms that started 1 week ago. +cough +nasal congestion. No n/v/d. No fevers. Patient c/o left eye redness that started 2 days ago. +drainage. No contact lens use. No vision changes.

## 2024-07-09 NOTE — TELEPHONE ENCOUNTER
Spoke with pt's sister.  She was told pt needs to see Dr Seals on 7/15/24 to be released for full work duty without restrictions.  Sister states she will relay this info to patient.  Verbalized understanding.

## 2024-07-09 NOTE — ED PROVIDER NOTES
Patient Seen in: [facility] [place]      History     Chief Complaint   Patient presents with    Eye Problem     Stated Complaint: Cough, sore throat    Subjective:   HPI    48-year-old Telugu speaking male with past medical history of HTN and DM, who presents with his wife with concern for one week of nasal congestion, ear pressure, and productive cough.  He also states that he noticed atraumatic erythema of the left eye with clear drainage two days ago.  He denies any shortness of breath or difficulty breathing.  No one else at home with similar symptoms.  The eye is mildly pruritic.  He is not a contact lens wear.  He does wear protective lenses at work as he states he works around dust but denies any known injuries while at work and denies any pain in the eye.  He denies any lower extremity edema.    Objective:   Past Medical History:    Diabetes (HCC)    Essential hypertension    Type 2 diabetes mellitus (HCC)              History reviewed. No pertinent surgical history.             Social History     Socioeconomic History    Marital status: Single   Tobacco Use    Smoking status: Never    Smokeless tobacco: Never   Vaping Use    Vaping status: Never Used   Substance and Sexual Activity    Alcohol use: No    Drug use: No   Other Topics Concern    Right Handed Yes              Review of Systems    Positive for stated Chief Complaint: Eye Problem    Other systems are as noted in HPI.  Constitutional and vital signs reviewed.      All other systems reviewed and negative except as noted above.    Physical Exam     ED Triage Vitals [07/09/24 1051]   /86   Pulse 83   Resp 18   Temp 97.6 °F (36.4 °C)   Temp src    SpO2 96 %   O2 Device None (Room air)       Current Vitals:   Vital Signs  BP: 127/86  Pulse: 83  Resp: 18  Temp: 97.6 °F (36.4 °C)    Oxygen Therapy  SpO2: 96 %  O2 Device: None (Room air)            Physical Exam    General: Awake, alert, comfortable on room air, in no distress, tolerating oral  secretions, interactive  Pulmonary: Lungs CTA B, no wheezing, no conversational dyspnea  Neuro: symmetrical facial expressions on gross observation  HEENT: PERRL, no direct or consensual photophobia, no hyphema, no hypopyon, no corneal ulcers, EOMI, no pain with extraocular muscle movement, no periorbital edema or erythema, no TTP of the periorbital region, on eversion of the eyelids no appreciated foreign bodies, there is left-sided scleral injection with greenish/yellow crusting at the medial canthus, on fluorescein stain no appreciated corneal abrasions of the left eye, negative Sada's, nonerythematous and nonedematous intact B/L TMs, no erythema or edema of the B/L ear canals, nonerythematous and nonedematous B/L tonsils, no tonsillar exudates, no peritonsillar edema, uvula midline, there is mild posterior pharyngeal cobblestoning as well as nasal congestion, tolerating oral secretions, normal speech, no submandibular edema  Neck: No anterior or posterior cervical lymphadenopathy  Psych: Normal mood, normal affect    ED Course   Copy of radiology report of patient's chest x-ray:    PROCEDURE: XR CHEST PA + LAT CHEST (CPT=71046)     COMPARISON: None.     INDICATIONS: Cough x 1 week.     TECHNIQUE:   Two views.       FINDINGS:  CARDIAC/VASC: Normal.  No cardiac silhouette abnormality or cardiomegaly.  Unremarkable pulmonary vasculature.    MEDIAST/CAROLEE: No visible mass or adenopathy.  LUNGS/PLEURA: Normal.  No significant pulmonary parenchymal abnormalities.  No effusion or pleural thickening.    BONES: Mild scoliotic curvature dorsal spine  OTHER: Negative.                Impression  CONCLUSION:  1. Normal chest radiographs.           Dictated by (CST): Ozzie Epps MD on 7/09/2024 at 11:46 AM      Finalized by (CST): Ozzie Epps MD on 7/09/2024 at 11:47 AM                 Exam Ended: 07/09/24 11:31 Last Resulted: 07/09/24 11:47              [facility]   Patient is awake, alert, afebrile, comfortable on  room air, in no distress, lungs CTAB with no wheezing with no signs of acute bronchospasm, story and exam consistent with likely viral URI with cough versus possible pneumonia given duration of symptoms and pt is diabetic and will chest x-ray, no signs of otitis media or otitis externa, no signs of tonsillitis or PTA or deep space infection at this time, left eye exam consistent with bacterial conjunctivitis with greenish/yellow crusting at the medial canthus with no signs of corneal oral ulcer or corneal abrasion and no foreign bodies on examination of the eyelids, negative Sada's with no signs of globe perforation, and no pain to suggest glaucoma, no hyphema, and no hypopyon to suggest endophthalmitis at this time, no signs of preseptal or septal cellulitis or cavernous sinus thrombosis at this time  -Patient and his wife declined COVID testing as given duration of symptoms patient is outside of the treatment window, currently afebrile with no indication to isolate as he has not had any fevers  -Per my personal review and interpretation of the patient's chest x-ray there is normal lung expansion with no consolidations, no effusions, no pneumothorax, and no signs of pneumonia.  This is consistent with my review of the radiology report as copied above.  -Will prescribe Polytrim antibiotic ophthalmic eyedrops for the left eye bacterial conjunctivitis.  Patient denies antibiotic allergies.  He is not a contact lens wearer with no indication to cover for Pseudomonas.  -We discussed good handwashing to help reduce risk of transmission.  -We discussed that symptoms should begin to improve within 72 hours on antibiotic eyedrops, if there is no improvement he should follow-up with his primary care doctor or an eye doctor for reassessment for complications versus resistance versus other causes and for further recommendations.  -For his remaining viral symptoms at this time, we discussed symptomatic management with rest,  hydration, and over the counter Tylenol or ibuprofen if needed for pain control, as long as patient has no contraindications to these medications  -We discussed that viral infections can progress and can lead to bacterial infections. We discussed that if the patient notices any new, progressing, or worsening signs or symptoms they should present immediately to their primary care physician for reassessment  -ED precautions discussed  -Please note a  was used for all interactions as well as for discharge instructions    Medical Decision Making  Amount and/or Complexity of Data Reviewed  Radiology: ordered and independent interpretation performed.    Risk  OTC drugs.  Prescription drug management.        Disposition and Plan     Clinical Impression:  1. Viral URI with cough    2. Acute bacterial conjunctivitis of left eye         Disposition:  Discharge  7/9/2024 12:09 pm    Follow-up:  Ozzie Staley, DO  [address] [place] [id number] [phone]    In 3 days  As needed, If symptoms worsen          Medications Prescribed:  Discharge Medication List as of 7/9/2024 12:10 PM          polymyxin B-trimethoprim 84605-4.1 UNIT/ML-% Ophthalmic Solution 10 mL 0 7/9/2024 --   Sig:   Apply one drop in the affected eye every three hours for the next seven days.  MAXIMUM dose is six doses per day, do NOT exceed six doses per day.     Route:   (none)

## 2024-07-09 NOTE — DISCHARGE INSTRUCTIONS
Your chest x-ray shows no signs of pneumonia.  At this time your exam and evaluation are consistent with a viral infection. Viral infections do not respond to antibiotics and are treated symptomatically. Ensure to rest and maintain hydration. Viral infections can progress and can lead to bacterial infections. If you develop any new, progressing, changing, or worsening signs or symptoms, please present immediately to your primary care physician for reassessment. If you develop any difficulty breathing, chest pain, shortness of breath, difficulty swallowing, drooling, signs or symptoms of dehydration, or any other concerning symptoms, call 911 or present immediately to the emergency department.       Your exam is also also consistent with conjunctivitis/pinkeye which is an infection of the conjunctival of the eye.  I prescribed topical antibiotics, please apply as prescribed.  Symptoms should begin to improve within 72 hours on antibiotics, if there is no improvement or if you develop any new, changing, or progressing signs or symptoms, present immediately to your primary care physician for reassessment.  Infections can progress despite antibiotic treatment, if you develop any redness or swelling around the eye, pain with moving the eye, fevers, or vision changes, present immediately to the emergency department for further assessment.

## 2024-07-09 NOTE — TELEPHONE ENCOUNTER
Patient would like a work letter stating he is able to work with no restrictions. Please contact patient when ready for .

## 2024-07-10 ENCOUNTER — TELEPHONE (OUTPATIENT)
Dept: SURGERY | Facility: CLINIC | Age: 49
End: 2024-07-10

## 2024-07-10 NOTE — TELEPHONE ENCOUNTER
Received a workers comp medical records request sent over to the forms and medical records department

## 2024-07-15 ENCOUNTER — OFFICE VISIT (OUTPATIENT)
Dept: SURGERY | Facility: CLINIC | Age: 49
End: 2024-07-15

## 2024-07-15 DIAGNOSIS — M25.641 JOINT STIFFNESS OF HAND, RIGHT: ICD-10-CM

## 2024-07-15 DIAGNOSIS — M62.81 DISTAL MUSCLE WEAKNESS: Primary | ICD-10-CM

## 2024-07-15 DIAGNOSIS — S62.254A CLOSED NONDISPLACED FRACTURE OF NECK OF FIRST METACARPAL BONE OF RIGHT HAND, INITIAL ENCOUNTER: Primary | ICD-10-CM

## 2024-07-15 PROCEDURE — 97110 THERAPEUTIC EXERCISES: CPT | Performed by: OCCUPATIONAL THERAPIST

## 2024-07-15 NOTE — PROGRESS NOTES
Subjective: I am ready to return to work.      Objective:     Current level of performance:  ADL: Independent  Work: Returning to full duty with no restrictions.  Leisure: Family    Measurements/Tests:  ROM:  Testing By: mary   Strength Right: 60 #      Strength Left: 80 #      Treatment Provided this day: Up dated bilateral hand strength measurements: Physician follow-up.    Treatment Time: 20 minutes      Summary/Analysis of Treatment session: No further OT needs at this time.      Plan: Discontinue OT      Follow up in:  To call with questions and or concerns.          Ken Enriquez  OTR/L

## 2024-07-15 NOTE — PROGRESS NOTES
Injury 1: L TH DP comminuted,nondisplaced fracture  - Date: 09/26/19  - Days Since: 1754    Injury 2: R TH MC neck fracture, impacted, nondisplaced  - Date: 06/07/24  - Days Since: 38    38 days postinjury  No complaints.  No pain.    Full painless range of motion    Strength 65 versus 75    Unrestricted activity  Back to work full duty      +++++++++++++++++++++++++++++++++++++++++      MEDICAL DECISION MAKING    PROBLEMS      MODERATE    (number / complexity)          Acute complicated injury    DATA         STRAIGHTFORWARD    (amount / complexity)              MANAGEMENT RISK  LOW    (complications/ morbidity)       Splint/OT                  MDM LEVEL    LOW

## 2024-07-24 ENCOUNTER — TELEPHONE (OUTPATIENT)
Dept: SURGERY | Facility: CLINIC | Age: 49
End: 2024-07-24

## 2024-07-24 NOTE — TELEPHONE ENCOUNTER
Received workers comp letter via fax requesting medical records. No YOSELYN or payment collected. Scanned and email to forms department.

## 2024-07-26 ENCOUNTER — TELEPHONE (OUTPATIENT)
Dept: SURGERY | Facility: CLINIC | Age: 49
End: 2024-07-26

## 2024-07-26 NOTE — TELEPHONE ENCOUNTER
Protected health information faxed to Carlos Eduardo, [phone], confirmation received.  MyChart sent to patient.

## 2024-08-07 ENCOUNTER — TELEPHONE (OUTPATIENT)
Dept: SURGERY | Facility: CLINIC | Age: 49
End: 2024-08-07

## 2024-08-19 NOTE — TELEPHONE ENCOUNTER
Protected health information request completed and uploaded [facility]. [facility].

## 2024-09-13 ENCOUNTER — APPOINTMENT (OUTPATIENT)
Dept: GENERAL RADIOLOGY | Age: 49
End: 2024-09-13
Attending: PHYSICIAN ASSISTANT
Payer: COMMERCIAL

## 2024-09-13 ENCOUNTER — HOSPITAL ENCOUNTER (OUTPATIENT)
Age: 49
Discharge: HOME | End: 2024-09-13
Payer: COMMERCIAL

## 2024-09-13 VITALS
WEIGHT: 142 LBS | TEMPERATURE: 97 F | OXYGEN SATURATION: 99 % | RESPIRATION RATE: 16 BRPM | BODY MASS INDEX: 22.82 KG/M2 | HEART RATE: 93 BPM | SYSTOLIC BLOOD PRESSURE: 148 MMHG | HEIGHT: 66 IN | DIASTOLIC BLOOD PRESSURE: 83 MMHG

## 2024-09-13 DIAGNOSIS — S67.10XA CRUSHING INJURY OF FINGER, INITIAL ENCOUNTER: ICD-10-CM

## 2024-09-13 DIAGNOSIS — S62.639A CLOSED COMMINUTED FRACTURE OF DISTAL PHALANX OF FINGER: Primary | ICD-10-CM

## 2024-09-13 DIAGNOSIS — S60.10XS: ICD-10-CM

## 2024-09-13 PROCEDURE — 99213 OFFICE O/P EST LOW 20 MIN: CPT

## 2024-09-13 PROCEDURE — 73140 X-RAY EXAM OF FINGER(S): CPT | Performed by: PHYSICIAN ASSISTANT

## 2024-09-13 PROCEDURE — 26750 TREAT FINGER FRACTURE EACH: CPT

## 2024-09-13 RX ORDER — IBUPROFEN 600 MG/1
600 TABLET, FILM COATED ORAL EVERY 6 HOURS PRN
Qty: 30 TABLET | Refills: 0 | Status: SHIPPED | OUTPATIENT
Start: 2024-09-13

## 2024-09-13 RX ORDER — CEFADROXIL 500 MG/1
500 CAPSULE ORAL 2 TIMES DAILY
Qty: 14 CAPSULE | Refills: 0 | Status: SHIPPED | OUTPATIENT
Start: 2024-09-13 | End: 2024-09-20

## 2024-09-13 NOTE — ED PROVIDER NOTES
Patient Seen in: [facility]      History     Chief Complaint   Patient presents with    Hand Pain     Stated Complaint: hand injury    Subjective:   HPI    Patient is a 48-year-old  male with hypertension, type 2 diabetes, right-hand-dominant, presenting to immediate care for evaluation of acute traumatic left third and fourth fingertip crush injury. Onset: 5 days ago.  Patient was working on his car.  Car hussein excellently crushed finger.  Endorses initial pain, swelling, and bruising.  Pain and swelling has subsided.  Having minimal associated pain with swelling and bruising at fingertips.  Coming to immediate care for further evaluation and x-ray imaging.  Concern for possible underlying fracture.  Taking ibuprofen for pain with moderate relief endorses decreased sensory at fingertip pad.  No weakness.  Has normal range of motion of extremity.  Denies any others concerns    Objective:   Past Medical History:    Diabetes (HCC)    Essential hypertension    Type 2 diabetes mellitus (HCC)              History reviewed. No pertinent surgical history.             Social History     Socioeconomic History    Marital status: Single   Tobacco Use    Smoking status: Never    Smokeless tobacco: Never   Vaping Use    Vaping status: Never Used   Substance and Sexual Activity    Alcohol use: No    Drug use: No   Other Topics Concern    Right Handed Yes              Review of Systems   Constitutional:  Positive for activity change.   Musculoskeletal:  Positive for joint swelling.        Left finger tip pain, swelling, bruising   Skin:  Positive for color change.   Neurological:  Negative for weakness.   Psychiatric/Behavioral:  Negative for confusion.        Positive for stated Chief Complaint: Hand Pain    Other systems are as noted in HPI.  Constitutional and vital signs reviewed.      All other systems reviewed and negative except as noted above.    Physical Exam     ED Triage Vitals [09/13/24 1725]   BP  148/83   Pulse 93   Resp 16   Temp 97.4 °F (36.3 °C)   Temp src Temporal   SpO2 99 %   O2 Device None (Room air)       Current Vitals:   Vital Signs  BP: 148/83  Pulse: 93  Resp: 16  Temp: 97.4 °F (36.3 °C)  Temp src: Temporal    Oxygen Therapy  SpO2: 99 %  O2 Device: None (Room air)            Physical Exam  Vitals and nursing note reviewed.   Constitutional:       General: He is not in acute distress.     Appearance: Normal appearance. He is not ill-appearing, toxic-appearing or diaphoretic.   HENT:      Head: Normocephalic and atraumatic.      Mouth/Throat:      Mouth: Mucous membranes are moist.   Eyes:      Conjunctiva/sclera: Conjunctivae normal.   Cardiovascular:      Pulses: Normal pulses.   Pulmonary:      Effort: No respiratory distress.   Musculoskeletal:         General: Swelling and signs of injury present. No deformity. Normal range of motion.      Comments: Minimal tenderness to palpation with soft tissue swelling left third and fourth fingertip.  Subungual hematoma left middle finger.  Skin and nail intact.  Compartments are soft.  Has normal range of motion of finger.  Normal sensory.  No erythema or warmth.  No drainage.  No sores or lesions   Skin:     Findings: Bruising present.   Neurological:      General: No focal deficit present.      Mental Status: He is alert and oriented to person, place, and time.      Sensory: No sensory deficit.      Motor: No weakness.      Coordination: Coordination normal.      Gait: Gait normal.   Psychiatric:         Mood and Affect: Mood normal.         Behavior: Behavior normal.           ED Course   Labs Reviewed - No data to display  XR FINGER(S) (MIN 2 VIEWS), LEFT 4TH (CPT=73140)   Final Result   PROCEDURE: XR FINGER(S) (MIN 2 VIEWS), LEFT 4TH (CPT=73140)       COMPARISON: [facility], XR FINGER(S) (MIN    2 VIEWS), RIGHT THUMB (CPT=73140, 6/07/2024, 8:55 AM.  [facility] [place] [facility], XR FINGER(S) (MIN 2  VIEWS), LEFT 4TH    (CPT=73140), 9/30/2023, 10:16 AM.       INDICATIONS: Left distal 3rd and 4th digit pain post crushing injury x 4    days ago.       TECHNIQUE: Three-view   Findings and impression:       Comminuted nondisplaced tuft fractures involving left 3rd and 4th digits       No dislocation   Finalized by (CST): Ken Duke MD on 9/13/2024 at 5:46 PM                            MDM     Differential diagnoses considered included, but are not exclusive of finger contusion, hematoma, subungual hematoma, finger sprain, finger fracture, etc.    Dx: Closed comminuted fractures of distal phalanx of left third and fourth, Initial Encounter  Traumatic-crush injury  Neurovascular intact  Compartment Soft  X-Ray: + acute fracture, comminuted fracture  Outpatient management  RICE Therapy  Pain management  Motrin as needed for pain  Buddy taping and finger splint for fracture immobilization  Hand orthopedic follow-up for evaluation of closed comminuted finger fracture  Return precautions            Medical Decision Making      Disposition and Plan     Clinical Impression:  1. Closed comminuted fracture of distal phalanx of finger    2. Crushing injury of finger, initial encounter    3. Subungual hematoma of fingernail, sequela         Disposition:  Discharge  9/13/2024  5:55 pm    Follow-up:  Sravan Martins MD  [address] [place] [id number]  [phone]      Hand Orthopedic Specialist          Medications Prescribed:  Current Discharge Medication List        START taking these medications    Details   cefadroxil 500 MG Oral Cap Take 1 capsule (500 mg total) by mouth 2 (two) times daily for 7 days.  Qty: 14 capsule, Refills: 0      !! ibuprofen 600 MG Oral Tab Take 1 tablet (600 mg total) by mouth every 6 (six) hours as needed for Pain.  Qty: 30 tablet, Refills: 0       !! - Potential duplicate medications found. Please discuss with provider.

## 2024-09-13 NOTE — ED INITIAL ASSESSMENT (HPI)
Pt presents to the IC with c/o left 3rd and 4th finger pain s/p a hussein crushing them on Monday while working on his car. +bruising, swelling and tenderness. Radial pulse 2+. Pt has been taking ibuprofen 600mg PO for pain control.

## 2024-09-29 ENCOUNTER — HOSPITAL ENCOUNTER (OUTPATIENT)
Age: 49
Discharge: HOME | End: 2024-09-29
Attending: EMERGENCY MEDICINE
Payer: COMMERCIAL

## 2024-09-29 VITALS
DIASTOLIC BLOOD PRESSURE: 80 MMHG | RESPIRATION RATE: 16 BRPM | HEART RATE: 82 BPM | TEMPERATURE: 98 F | OXYGEN SATURATION: 100 % | SYSTOLIC BLOOD PRESSURE: 157 MMHG

## 2024-09-29 DIAGNOSIS — S60.142A SUBUNGUAL HEMATOMA OF LEFT RING FINGER: ICD-10-CM

## 2024-09-29 DIAGNOSIS — S62.665D CLOSED NONDISPLACED FRACTURE OF DISTAL PHALANX OF LEFT RING FINGER WITH ROUTINE HEALING, SUBSEQUENT ENCOUNTER: Primary | ICD-10-CM

## 2024-09-29 PROCEDURE — 99212 OFFICE O/P EST SF 10 MIN: CPT

## 2024-09-29 NOTE — ED PROVIDER NOTES
Patient Seen in: [facility]      History     Chief Complaint   Patient presents with    Finger Pain     Stated Complaint: Finger injury    Subjective:   HPI    The patient is a 49-year-old male with past history of hypertension, diabetes, recent distal phalanx fractures of his left third and fourth digits who presents now for recheck.  Patient states he injured his fingers approximately a month ago.  The patient followed up here on 9/13/2024 which was approximately 5 days after his injury and was diagnosed with still phalanx fractures to the left third and fourth digits.  Patient states that the pain has improved though he continues to have some mild throbbing pain in the left ring finger.    Objective:   Past Medical History:    Diabetes (HCC)    Essential hypertension    Type 2 diabetes mellitus (HCC)              History reviewed. No pertinent surgical history.             Social History     Socioeconomic History    Marital status:    Tobacco Use    Smoking status: Never    Smokeless tobacco: Never   Vaping Use    Vaping status: Never Used   Substance and Sexual Activity    Alcohol use: No    Drug use: No   Other Topics Concern    Right Handed Yes              Review of Systems    Positive for stated Chief Complaint: Finger Pain    Other systems are as noted in HPI.  Constitutional and vital signs reviewed.      All other systems reviewed and negative except as noted above.    Physical Exam     ED Triage Vitals [09/29/24 1355]   /80   Pulse 82   Resp 16   Temp 98.4 °F (36.9 °C)   Temp src Temporal   SpO2 100 %   O2 Device None (Room air)       Current Vitals:   Vital Signs  BP: 157/80  Pulse: 82  Resp: 16  Temp: 98.4 °F (36.9 °C)  Temp src: Temporal    Oxygen Therapy  SpO2: 100 %  O2 Device: None (Room air)            Physical Exam    Constitutional: Well-developed well-nourished in no acute distress  Head: Normocephalic, no swelling or tenderness  Eyes: Nonicteric sclera, no  conjunctival injection  Vascular: Palpable left radial pulse with good capillary refill and all fingers  Neurologic: Patient is awake, alert and oriented ×3.  The patient's motor strength is 5 out of 5 and symmetric in the upper and lower extremities bilaterally  Extremities: There is minimal tenderness to the left third finger.  There is mild tenderness to the left distal fourth finger.  There is a approximately 50% subungual hematoma.  There is mild fullness at the base of the left nail.  There is no palpable fluctuance  Skin: Minimal redness to the dorsal aspect of the left ring finger just proximal to the left nail plate      ED Course   Labs Reviewed - No data to display          Discussed with patient that no evidence of infection.  Possible hematoma as the patient has persistent left subungual hematoma.  Recommend follow-up with Dr. Seals to confirm appropriate healing of the distal phalanx fractures.  The patient has seen this physician in the past for previous work-related injury         MDM      Distal phalanx fractures with routine healing versus delayed healing                                   Medical Decision Making      Disposition and Plan     Clinical Impression:  1. Closed nondisplaced fracture of distal phalanx of left ring finger with routine healing, subsequent encounter    2. Subungual hematoma of left ring finger         Disposition:  Discharge  9/29/2024  2:02 pm    Follow-up:  Sravan Martins MD  [facility] [id number] [phone]      Call for an appointment          Medications Prescribed:  Discharge Medication List as of 9/29/2024  2:04 PM

## 2024-09-29 NOTE — ED INITIAL ASSESSMENT (HPI)
See here 9/13 for crush injury to left 3rd finger. States his finger is much better except for the nail. + subungual hematoma. C/o throbbing pain.

## 2024-10-15 ENCOUNTER — TELEPHONE (OUTPATIENT)
Dept: FAMILY MEDICINE CLINIC | Facility: CLINIC | Age: 49
End: 2024-10-15

## 2024-10-15 DIAGNOSIS — E11.65 TYPE 2 DIABETES MELLITUS WITH HYPERGLYCEMIA, WITHOUT LONG-TERM CURRENT USE OF INSULIN (HCC): Primary | ICD-10-CM

## 2024-10-18 ENCOUNTER — LAB ENCOUNTER (OUTPATIENT)
Dept: LAB | Age: 49
End: 2024-10-18
Attending: FAMILY MEDICINE
Payer: COMMERCIAL

## 2024-10-18 ENCOUNTER — OFFICE VISIT (OUTPATIENT)
Dept: FAMILY MEDICINE CLINIC | Facility: CLINIC | Age: 49
End: 2024-10-18

## 2024-10-18 VITALS
HEART RATE: 79 BPM | DIASTOLIC BLOOD PRESSURE: 68 MMHG | HEIGHT: 66 IN | SYSTOLIC BLOOD PRESSURE: 121 MMHG | BODY MASS INDEX: 23.14 KG/M2 | WEIGHT: 144 LBS

## 2024-10-18 DIAGNOSIS — E11.65 TYPE 2 DIABETES MELLITUS WITH HYPERGLYCEMIA, WITHOUT LONG-TERM CURRENT USE OF INSULIN (HCC): ICD-10-CM

## 2024-10-18 DIAGNOSIS — Z12.12 SCREENING FOR COLORECTAL CANCER: ICD-10-CM

## 2024-10-18 DIAGNOSIS — E11.65 TYPE 2 DIABETES MELLITUS WITH HYPERGLYCEMIA, WITHOUT LONG-TERM CURRENT USE OF INSULIN (HCC): Primary | ICD-10-CM

## 2024-10-18 DIAGNOSIS — Z12.11 SCREENING FOR COLORECTAL CANCER: ICD-10-CM

## 2024-10-18 LAB
ALT SERPL-CCNC: 22 U/L
ANION GAP SERPL CALC-SCNC: 7 MMOL/L (ref 0–18)
AST SERPL-CCNC: 20 U/L (ref ?–34)
BUN BLD-MCNC: 17 MG/DL (ref 9–23)
BUN/CREAT SERPL: 18.7 (ref 10–20)
CALCIUM BLD-MCNC: 9.7 MG/DL (ref 8.7–10.4)
CHLORIDE SERPL-SCNC: 100 MMOL/L (ref 98–112)
CHOLEST SERPL-MCNC: 241 MG/DL (ref ?–200)
CO2 SERPL-SCNC: 30 MMOL/L (ref 21–32)
COMPLEXED PSA SERPL-MCNC: 0.23 NG/ML (ref ?–4)
CREAT BLD-MCNC: 0.91 MG/DL
CREAT UR-SCNC: 39.8 MG/DL
EGFRCR SERPLBLD CKD-EPI 2021: 102 ML/MIN/1.73M2 (ref 60–?)
EST. AVERAGE GLUCOSE BLD GHB EST-MCNC: 263 MG/DL (ref 68–126)
FASTING PATIENT LIPID ANSWER: NO
FASTING STATUS PATIENT QL REPORTED: NO
GLUCOSE BLD-MCNC: 323 MG/DL (ref 70–99)
HBA1C MFR BLD: 10.8 % (ref ?–5.7)
HDLC SERPL-MCNC: 44 MG/DL (ref 40–59)
LDLC SERPL CALC-MCNC: 163 MG/DL (ref ?–100)
MICROALBUMIN UR-MCNC: <0.3 MG/DL
NONHDLC SERPL-MCNC: 197 MG/DL (ref ?–130)
OSMOLALITY SERPL CALC.SUM OF ELEC: 298 MOSM/KG (ref 275–295)
POTASSIUM SERPL-SCNC: 4.1 MMOL/L (ref 3.5–5.1)
SODIUM SERPL-SCNC: 137 MMOL/L (ref 136–145)
TRIGL SERPL-MCNC: 183 MG/DL (ref 30–149)
TSI SER-ACNC: 0.93 MIU/ML (ref 0.55–4.78)
VLDLC SERPL CALC-MCNC: 36 MG/DL (ref 0–30)

## 2024-10-18 PROCEDURE — 3008F BODY MASS INDEX DOCD: CPT | Performed by: FAMILY MEDICINE

## 2024-10-18 PROCEDURE — 36415 COLL VENOUS BLD VENIPUNCTURE: CPT

## 2024-10-18 PROCEDURE — 82570 ASSAY OF URINE CREATININE: CPT

## 2024-10-18 PROCEDURE — 84443 ASSAY THYROID STIM HORMONE: CPT

## 2024-10-18 PROCEDURE — 80048 BASIC METABOLIC PNL TOTAL CA: CPT

## 2024-10-18 PROCEDURE — 84450 TRANSFERASE (AST) (SGOT): CPT

## 2024-10-18 PROCEDURE — 83036 HEMOGLOBIN GLYCOSYLATED A1C: CPT

## 2024-10-18 PROCEDURE — 90471 IMMUNIZATION ADMIN: CPT | Performed by: FAMILY MEDICINE

## 2024-10-18 PROCEDURE — 82043 UR ALBUMIN QUANTITATIVE: CPT

## 2024-10-18 PROCEDURE — 3078F DIAST BP <80 MM HG: CPT | Performed by: FAMILY MEDICINE

## 2024-10-18 PROCEDURE — 90656 IIV3 VACC NO PRSV 0.5 ML IM: CPT | Performed by: FAMILY MEDICINE

## 2024-10-18 PROCEDURE — 80061 LIPID PANEL: CPT

## 2024-10-18 PROCEDURE — 3074F SYST BP LT 130 MM HG: CPT | Performed by: FAMILY MEDICINE

## 2024-10-18 PROCEDURE — 99214 OFFICE O/P EST MOD 30 MIN: CPT | Performed by: FAMILY MEDICINE

## 2024-10-18 PROCEDURE — 84460 ALANINE AMINO (ALT) (SGPT): CPT

## 2024-10-18 RX ORDER — POLYMYXIN B SULFATE AND TRIMETHOPRIM 1; 10000 MG/ML; [USP'U]/ML
SOLUTION OPHTHALMIC
Qty: 10 ML | Refills: 0 | Status: CANCELLED | OUTPATIENT
Start: 2024-10-18

## 2024-10-18 RX ORDER — LISINOPRIL 10 MG/1
10 TABLET ORAL DAILY
Qty: 90 TABLET | Refills: 0 | Status: SHIPPED | OUTPATIENT
Start: 2024-10-18

## 2024-10-18 RX ORDER — ESCITALOPRAM OXALATE 20 MG/1
20 TABLET ORAL DAILY
Qty: 90 TABLET | Refills: 0 | Status: SHIPPED | OUTPATIENT
Start: 2024-10-18

## 2024-10-18 RX ORDER — ROSUVASTATIN CALCIUM 20 MG/1
20 TABLET, COATED ORAL NIGHTLY
Qty: 90 TABLET | Refills: 0 | Status: SHIPPED | OUTPATIENT
Start: 2024-10-18

## 2024-10-18 RX ORDER — CLONAZEPAM 0.5 MG/1
0.5 TABLET ORAL NIGHTLY PRN
Qty: 20 TABLET | Refills: 0 | Status: SHIPPED | OUTPATIENT
Start: 2024-10-18

## 2024-10-18 NOTE — PROGRESS NOTES
Blood pressure 121/68, pulse 79, height 5' 6\" (1.676 m), weight 144 lb (65.3 kg).      Follow up for diabetes no chest pain no sob.   Has anxiety no suicidal ideation or drug use.        OBJECTIVE     Bilateral barefoot skin diabetic exam is normal, visualized feet and the appearance is normal.  Bilateral monofilament/sensation of both feet is normal.  Pulsation pedal pulse exam of both lower legs/feet is normal as well.       HEART RRR NO S3S4 NO MURMUR      1. Type 2 diabetes mellitus with hyperglycemia, without long-term current use of insulin (HCC)    - clonazePAM 0.5 MG Oral Tab; Take 1 tablet (0.5 mg total) by mouth nightly as needed for Anxiety.  Dispense: 20 tablet; Refill: 0  - lisinopril 10 MG Oral Tab; Take 1 tablet (10 mg total) by mouth daily.  Dispense: 90 tablet; Refill: 0  - metFORMIN HCl 1000 MG Oral Tab; Take 1 tablet (1,000 mg total) by mouth 2 (two) times daily with meals.  Dispense: 180 tablet; Refill: 0  - rosuvastatin 20 MG Oral Tab; Take 1 tablet (20 mg total) by mouth nightly.  Dispense: 90 tablet; Refill: 0  - escitalopram 20 MG Oral Tab; Take 1 tablet (20 mg total) by mouth daily.  Dispense: 90 tablet; Refill: 0  - empagliflozin (JARDIANCE) 25 MG Oral Tab; Take 1 tablet (25 mg total) by mouth every morning.  Dispense: 90 tablet; Refill: 0  - Ophthalmology Referral - In Network  - Occult Blood, Fecal, FIT Immunoassay; Future  - Fluzone trivalent vaccine, PF 0.5mL, 6mo+ (38766)    2. Screening for colorectal cancer    - Occult Blood, Fecal, FIT Immunoassay; Future  - Fluzone trivalent vaccine, PF 0.5mL, 6mo+ ([id number])

## 2024-10-21 RX ORDER — ROSUVASTATIN CALCIUM 20 MG/1
20 TABLET, COATED ORAL NIGHTLY
Qty: 90 TABLET | Refills: 0 | OUTPATIENT
Start: 2024-10-21

## 2024-10-22 ENCOUNTER — OFFICE VISIT (OUTPATIENT)
Dept: FAMILY MEDICINE CLINIC | Facility: CLINIC | Age: 49
End: 2024-10-22

## 2024-10-22 VITALS
BODY MASS INDEX: 23.46 KG/M2 | WEIGHT: 146 LBS | HEART RATE: 73 BPM | SYSTOLIC BLOOD PRESSURE: 131 MMHG | DIASTOLIC BLOOD PRESSURE: 73 MMHG | HEIGHT: 66 IN

## 2024-10-22 DIAGNOSIS — E78.2 MIXED HYPERLIPIDEMIA: ICD-10-CM

## 2024-10-22 DIAGNOSIS — E11.65 TYPE 2 DIABETES MELLITUS WITH HYPERGLYCEMIA, WITHOUT LONG-TERM CURRENT USE OF INSULIN (HCC): Primary | ICD-10-CM

## 2024-10-22 DIAGNOSIS — E11.65 TYPE 2 DIABETES MELLITUS WITH HYPERGLYCEMIA, WITHOUT LONG-TERM CURRENT USE OF INSULIN (HCC): ICD-10-CM

## 2024-10-22 PROCEDURE — 3008F BODY MASS INDEX DOCD: CPT | Performed by: PHYSICIAN ASSISTANT

## 2024-10-22 PROCEDURE — 3061F NEG MICROALBUMINURIA REV: CPT | Performed by: PHYSICIAN ASSISTANT

## 2024-10-22 PROCEDURE — 99213 OFFICE O/P EST LOW 20 MIN: CPT | Performed by: PHYSICIAN ASSISTANT

## 2024-10-22 PROCEDURE — 3075F SYST BP GE 130 - 139MM HG: CPT | Performed by: PHYSICIAN ASSISTANT

## 2024-10-22 PROCEDURE — 3046F HEMOGLOBIN A1C LEVEL >9.0%: CPT | Performed by: PHYSICIAN ASSISTANT

## 2024-10-22 PROCEDURE — 3078F DIAST BP <80 MM HG: CPT | Performed by: PHYSICIAN ASSISTANT

## 2024-10-22 RX ORDER — TIRZEPATIDE 2.5 MG/.5ML
2.5 INJECTION, SOLUTION SUBCUTANEOUS
Qty: 2 ML | Refills: 0 | Status: SHIPPED | OUTPATIENT
Start: 2024-10-22 | End: 2024-11-21

## 2024-10-22 RX ORDER — ROSUVASTATIN CALCIUM 20 MG/1
20 TABLET, COATED ORAL NIGHTLY
Qty: 90 TABLET | Refills: 3 | Status: SHIPPED | OUTPATIENT
Start: 2024-10-22

## 2024-10-22 NOTE — PROGRESS NOTES
HPI:     HPI  A 49-year-old male is in the office for a follow-up. The patient is doing fine at this time. The patient does not take Crestor for the past year.  The patient denies chest pain, SOB, N/V/C/D, fever, dizziness, syncope, and abdominal pain. There are no other concerns today.    Medications:     Current Outpatient Medications   Medication Sig Dispense Refill    rosuvastatin 20 MG Oral Tab Take 1 tablet (20 mg total) by mouth nightly. 90 tablet 3    Tirzepatide (MOUNJARO) 2.5 MG/0.5ML Subcutaneous Solution Auto-injector Inject 2.5 mg into the skin every 7 days. 2 mL 0    clonazePAM 0.5 MG Oral Tab Take 1 tablet (0.5 mg total) by mouth nightly as needed for Anxiety. 20 tablet 0    lisinopril 10 MG Oral Tab Take 1 tablet (10 mg total) by mouth daily. 90 tablet 0    metFORMIN HCl 1000 MG Oral Tab Take 1 tablet (1,000 mg total) by mouth 2 (two) times daily with meals. 180 tablet 0    escitalopram 20 MG Oral Tab Take 1 tablet (20 mg total) by mouth daily. 90 tablet 0    empagliflozin (JARDIANCE) 25 MG Oral Tab Take 1 tablet (25 mg total) by mouth every morning. 90 tablet 0       Allergies:   Allergies[1]    History:     Health Maintenance   Topic Date Due    Diabetes Care Dilated Eye Exam  Never done    Colorectal Cancer Screening  Never done    Annual Physical  Never done    Zoster Vaccines (1 of 2) Never done    Annual Depression Screening  01/01/2024    COVID-19 Vaccine (4 - 2023-24 season) 09/01/2024    Diabetes Care A1C  01/18/2025    Diabetes Care Foot Exam  10/18/2025    Diabetes Care: GFR  10/18/2025    Diabetes Care: Microalb/Creat Ratio  10/18/2025    PSA  10/18/2026    DTaP,Tdap,and Td Vaccines (3 - Td or Tdap) 06/07/2034    Influenza Vaccine  Completed    Pneumococcal Vaccine: Birth to 64yrs  Completed       No LMP for male patient.   Past Medical History:     Past Medical History:    Diabetes (HCC)    Essential hypertension    Type 2 diabetes mellitus (HCC)       Past Surgical History:   History  reviewed. No pertinent surgical history.    Family History:   History reviewed. No pertinent family history.    Social History:     Social History     Socioeconomic History    Marital status:      Spouse name: Not on file    Number of children: Not on file    Years of education: Not on file    Highest education level: Not on file   Occupational History    Not on file   Tobacco Use    Smoking status: Never    Smokeless tobacco: Never   Vaping Use    Vaping status: Never Used   Substance and Sexual Activity    Alcohol use: No    Drug use: No    Sexual activity: Not on file   Other Topics Concern    Left Handed Not Asked    Right Handed Yes    Currently spends a great deal of time in the sun Not Asked    Past Sunlamp Treatments for Acne Not Asked    History of tanning Not Asked    Hx of Spending Great Deal of Time in Sun Not Asked    Bad sunburns in the past Not Asked    Tanning Salons in the Past Not Asked    Hx of Radiation Treatments Not Asked    Regular use of sun block Not Asked   Social History Narrative    Not on file     Social Drivers of Health     Financial Resource Strain: Not on file   Food Insecurity: Not on file   Transportation Needs: Not on file   Physical Activity: Not on file   Stress: Not on file   Social Connections: Not on file   Housing Stability: Not on file       Review of Systems:   Review of Systems   Constitutional:  Negative for activity change, chills, fatigue and fever.   HENT:  Negative for congestion, ear discharge, ear pain, postnasal drip, rhinorrhea, sinus pressure, sinus pain and sore throat.    Respiratory:  Negative for cough, chest tightness, shortness of breath and wheezing.    Cardiovascular:  Negative for chest pain and palpitations.   Gastrointestinal:  Negative for abdominal distention, abdominal pain, blood in stool, constipation, diarrhea, nausea and vomiting.   Skin:  Negative for rash.        Vitals:    10/22/24 1754   BP: 131/73   Pulse: 73   Weight: 146 lb (66.2  kg)   Height: 5' 6\" (1.676 m)     Body mass index is 23.57 kg/m².    Physical Exam:   Physical Exam  Vitals reviewed.      Patient alert and orient x3, able to carry on conversation easily, without shortness of breath, coughing, can speak in full sentences.      Assessment and Plan::     Problem List Items Addressed This Visit          HCC Problems    Type 2 diabetes mellitus with hyperglycemia, without long-term current use of insulin (HCC) - Primary    Relevant Medications        Tirzepatide (MOUNJARO) 2.5 MG/0.5ML Subcutaneous Solution Auto-injector  Reviewed and discussed lab results with the patient. Continue with Metformin 1000 mg PO BID, and Jardiance 25 mg QAM. Start Mounjaro: 2.5 mg subcutaneous Qweek for 4 weeks, then increase Mounjaro 5 mg subcutaneous Qweek.         Cardiac and Vasculature    Mixed hyperlipidemia    Relevant Medications    rosuvastatin 20 MG Oral Tab    Start Crestor 20 mg at bedtime. Recheck HbA1C and fasting Lipid panel in 3 months.       Discussed plan of care with pt and pt is in agreement.All questions answered. Pt to call with questions or concerns.    F/U in 3 months         [1] No Known Allergies

## 2024-10-24 RX ORDER — ROSUVASTATIN CALCIUM 20 MG/1
20 TABLET, COATED ORAL NIGHTLY
Qty: 90 TABLET | Refills: 0 | OUTPATIENT
Start: 2024-10-24

## 2024-10-24 RX ORDER — EMPAGLIFLOZIN 25 MG/1
25 TABLET, FILM COATED ORAL EVERY MORNING
Qty: 90 TABLET | Refills: 0 | OUTPATIENT
Start: 2024-10-24

## 2024-11-19 ENCOUNTER — TELEPHONE (OUTPATIENT)
Dept: FAMILY MEDICINE CLINIC | Facility: CLINIC | Age: 49
End: 2024-11-19

## 2024-11-19 DIAGNOSIS — E11.65 TYPE 2 DIABETES MELLITUS WITH HYPERGLYCEMIA, WITHOUT LONG-TERM CURRENT USE OF INSULIN (HCC): Primary | ICD-10-CM

## 2024-11-19 RX ORDER — TIRZEPATIDE 5 MG/.5ML
5 INJECTION, SOLUTION SUBCUTANEOUS
Qty: 9 ML | Refills: 1 | Status: SHIPPED | OUTPATIENT
Start: 2024-11-19 | End: 2024-11-19

## 2024-11-19 RX ORDER — TIRZEPATIDE 5 MG/.5ML
5 INJECTION, SOLUTION SUBCUTANEOUS
Qty: 9 ML | Refills: 1 | Status: SHIPPED | OUTPATIENT
Start: 2024-11-19 | End: 2025-02-17

## 2024-11-19 NOTE — TELEPHONE ENCOUNTER
Language Line Ukrainian   # [phone]:  Called patient,verified name and date of birth.  Reviewed  recommendations from Solomon Lowery's 11/19/24 note below that Mounjaro was sent to [facility] .  Patient verbalizes understanding and requests that it be sent to [facility] in [place] instead.  Mounjaro 5 mg/0.5ml prescription sent to [facility] as per Solomon Lowery's 11/19/24 written order.

## 2024-11-19 NOTE — TELEPHONE ENCOUNTER
Jey Lowery PA-C - please advise on Refill of Mounjaro, see notes below.     Use [employer]         Language Line utilized.  ID # [id number], [employer] .  Patient called office. Date of birth and full name both confirmed.   Providing update --- he has taken his second dose of Mounjaro and no side effects.  Per patient, tolerating well.   Ready for refill and increased dose if advised.     Per 10/22/24 Jey Lowery PA-C Visit:   \"Start Mounjaro: 2.5 mg subcutaneous Qweek for 4 weeks, then increase Mounjaro 5 mg subcutaneous Qweek\"

## 2024-11-21 ENCOUNTER — TELEPHONE (OUTPATIENT)
Dept: FAMILY MEDICINE CLINIC | Facility: CLINIC | Age: 49
End: 2024-11-21

## 2024-11-21 NOTE — TELEPHONE ENCOUNTER
Tirzepatide (MOUNJARO) 5 MG/0.5ML Subcutaneous Solution Auto-injector, Inject 5 mg into the skin every 7 days., Disp: 9 mL, Rfl: 1      ALTERNATIVE REQUESTED: NOT COVERED

## 2025-01-11 ENCOUNTER — LAB ENCOUNTER (OUTPATIENT)
Dept: LAB | Age: 50
End: 2025-01-11
Attending: PHYSICIAN ASSISTANT
Payer: COMMERCIAL

## 2025-01-11 DIAGNOSIS — E11.65 TYPE 2 DIABETES MELLITUS WITH HYPERGLYCEMIA, WITHOUT LONG-TERM CURRENT USE OF INSULIN (HCC): ICD-10-CM

## 2025-01-11 DIAGNOSIS — E78.2 MIXED HYPERLIPIDEMIA: ICD-10-CM

## 2025-01-11 LAB
ALBUMIN SERPL-MCNC: 4.9 G/DL (ref 3.2–4.8)
ALBUMIN/GLOB SERPL: 2 {RATIO} (ref 1–2)
ALP LIVER SERPL-CCNC: 64 U/L
ALT SERPL-CCNC: 24 U/L
ANION GAP SERPL CALC-SCNC: 7 MMOL/L (ref 0–18)
AST SERPL-CCNC: 26 U/L (ref ?–34)
BILIRUB SERPL-MCNC: 0.8 MG/DL (ref 0.3–1.2)
BUN BLD-MCNC: 19 MG/DL (ref 9–23)
BUN/CREAT SERPL: 23.8 (ref 10–20)
CALCIUM BLD-MCNC: 9.6 MG/DL (ref 8.7–10.4)
CHLORIDE SERPL-SCNC: 105 MMOL/L (ref 98–112)
CHOLEST SERPL-MCNC: 170 MG/DL (ref ?–200)
CO2 SERPL-SCNC: 27 MMOL/L (ref 21–32)
CREAT BLD-MCNC: 0.8 MG/DL
EGFRCR SERPLBLD CKD-EPI 2021: 107 ML/MIN/1.73M2 (ref 60–?)
EST. AVERAGE GLUCOSE BLD GHB EST-MCNC: 166 MG/DL (ref 68–126)
FASTING PATIENT LIPID ANSWER: YES
FASTING STATUS PATIENT QL REPORTED: YES
GLOBULIN PLAS-MCNC: 2.4 G/DL (ref 2–3.5)
GLUCOSE BLD-MCNC: 126 MG/DL (ref 70–99)
HBA1C MFR BLD: 7.4 % (ref ?–5.7)
HDLC SERPL-MCNC: 41 MG/DL (ref 40–59)
LDLC SERPL CALC-MCNC: 110 MG/DL (ref ?–100)
NONHDLC SERPL-MCNC: 129 MG/DL (ref ?–130)
OSMOLALITY SERPL CALC.SUM OF ELEC: 292 MOSM/KG (ref 275–295)
POTASSIUM SERPL-SCNC: 4.2 MMOL/L (ref 3.5–5.1)
PROT SERPL-MCNC: 7.3 G/DL (ref 5.7–8.2)
SODIUM SERPL-SCNC: 139 MMOL/L (ref 136–145)
TRIGL SERPL-MCNC: 101 MG/DL (ref 30–149)
VLDLC SERPL CALC-MCNC: 17 MG/DL (ref 0–30)

## 2025-01-11 PROCEDURE — 83036 HEMOGLOBIN GLYCOSYLATED A1C: CPT

## 2025-01-11 PROCEDURE — 36415 COLL VENOUS BLD VENIPUNCTURE: CPT

## 2025-01-11 PROCEDURE — 80053 COMPREHEN METABOLIC PANEL: CPT

## 2025-01-11 PROCEDURE — 80061 LIPID PANEL: CPT

## 2025-02-04 DIAGNOSIS — E11.65 TYPE 2 DIABETES MELLITUS WITH HYPERGLYCEMIA, WITHOUT LONG-TERM CURRENT USE OF INSULIN (HCC): ICD-10-CM

## 2025-02-07 RX ORDER — ESCITALOPRAM OXALATE 20 MG/1
20 TABLET ORAL DAILY
Qty: 90 TABLET | Refills: 1 | Status: SHIPPED | OUTPATIENT
Start: 2025-02-07

## 2025-02-07 NOTE — TELEPHONE ENCOUNTER
Please review; protocol failed/ has no protocol    Requested Prescriptions   Pending Prescriptions Disp Refills    ESCITALOPRAM 20 MG Oral Tab [Pharmacy Med Name: ESCITALOPRAM 20MG TABLETS] 90 tablet 0     Sig: TAKE 1 TABLET BY MOUTH EVERY DAY       Psychiatric Non-Scheduled (Anti-Anxiety) Failed - 2/7/2025  2:49 PM        Failed - Depression Screening completed within the past 12 months        Passed - In person appointment or virtual visit in the past 6 mos or appointment in next 3 mos     Recent Outpatient Visits              3 months ago Type 2 diabetes mellitus with hyperglycemia, without long-term current use of insulin (formerly [facility])    [facility], [address], [place] Don Lowery PA-C    Office Visit    3 months ago Type 2 diabetes mellitus with hyperglycemia, without long-term current use of insulin ([facility]     [facility] Visit    6 months ago Closed nondisplaced fracture of neck of first metacarpal bone of right hand, initial encounter    [facility] [place]    Office Visit    6 months ago Distal muscle weakness    [facility] [place] [facility]    Office Visit    7 months ago Distal muscle weakness    [facility] [place] [facility]    Office Visit                      Passed - Medication is active on med list           Recent Outpatient Visits              3 months ago Type 2 diabetes mellitus with hyperglycemia, without long-term current use of insulin (formerly [facility])    [facility], [address], [place] Don Lowery PA-C    Office Visit    3 months ago Type 2 diabetes mellitus with hyperglycemia, without long-term current use of insulin (formerly [facility])    [facility]    Office Visit    6 months ago Closed  nondisplaced fracture of neck of first metacarpal bone of right hand, initial encounter    [facility] [place]    Office Visit    6 months ago Distal muscle weakness    [facility]    Office Visit    7 months ago Distal muscle weakness    [facility], Ken Deal, OTRL    Office Visit

## 2025-02-08 DIAGNOSIS — E11.65 TYPE 2 DIABETES MELLITUS WITH HYPERGLYCEMIA, WITHOUT LONG-TERM CURRENT USE OF INSULIN (HCC): ICD-10-CM

## 2025-02-08 NOTE — TELEPHONE ENCOUNTER
empagliflozin (JARDIANCE) 25 MG Oral Tab, Take 1 tablet (25 mg total) by mouth every morning., Disp: 90 tablet, Rfl: 0    Please send to [facility]

## 2025-02-12 NOTE — TELEPHONE ENCOUNTER
Last Office Visit: with Dr millard  Empagliflozin 25 mg Oral Every morning   Requested Prescriptions   Pending Prescriptions Disp Refills    empagliflozin (JARDIANCE) 25 MG Oral Tab 90 tablet 3     Sig: Take 1 tablet (25 mg total) by mouth every morning.       Diabetes Medication Protocol Passed - 2/12/2025  1:07 PM        Passed - Last A1C < 7.5 and within past 6 months     Lab Results   Component Value Date    A1C 7.4 (H) 01/11/2025             Passed - In person appointment or virtual visit in the past 6 mos or appointment in next 3 mos     Recent Outpatient Visits              3 months ago Type 2 diabetes mellitus with hyperglycemia, without long-term current use of insulin ([facility])    [facility] [place]    Office Visit    3 months ago Type 2 diabetes mellitus with hyperglycemia, without long-term current use of insulin ([facility])    [facility] [place]    Office Visit    7 months ago Closed nondisplaced fracture of neck of first metacarpal bone of right hand, initial encounter    [facility], Sravan Kay MD    Office Visit    7 months ago [facility]    Office Visit    7 months ago [facility]al muscle [facility]    Office Visit                      Passed - Microalbumin procedure in past 12 months or taking ACE/ARB        Passed - EGFRCR or GFRNAA > 50     GFR Evaluation  EGFRCR: 107 , resulted on 1/11/2025          Passed - GFR in the past 12 months        Passed - Medication is active on med list                 Recent Outpatient Visits              3 months ago Type 2 diabetes mellitus with hyperglycemia, without long-term current use of insulin ([facility])    [facility],  [place] Nguyen, Minhxuyen, PA-C    Office Visit    3 months ago Type 2 diabetes mellitus with hyperglycemia, without long-term current use of insulin (HCC)    [facility], [place] Cespedes, David B, DO    Office Visit    7 months ago Closed nondisplaced fracture of neck of first metacarpal bone of right hand, initial encounter    [facility], [place] Sravan Martins MD    Office Visit    7 months ago Distal muscle weakness    [facility], eKn Deal, OTRL    Office Visit    7 months ago Distal muscle weakness    [facility], [place] Deal, OTRL    Office Visit

## 2025-03-15 NOTE — TELEPHONE ENCOUNTER
[place] leaving [place] and needs refill    Semaglutide (RYBELSUS) 7 MG Oral Tab () 90 tablet 0 2025   Sig:   Take 7 mg by mouth daily.       [facility]

## 2025-03-18 RX ORDER — ORAL SEMAGLUTIDE 7 MG/1
7 TABLET ORAL DAILY
Qty: 90 TABLET | Refills: 0 | Status: SHIPPED | OUTPATIENT
Start: 2025-03-18 | End: 2025-04-17

## 2025-05-16 DIAGNOSIS — E11.65 TYPE 2 DIABETES MELLITUS WITH HYPERGLYCEMIA, WITHOUT LONG-TERM CURRENT USE OF INSULIN (HCC): ICD-10-CM

## 2025-05-16 RX ORDER — ESCITALOPRAM OXALATE 20 MG/1
20 TABLET ORAL DAILY
Qty: 90 TABLET | Refills: 1 | OUTPATIENT
Start: 2025-05-16

## 2025-05-16 NOTE — TELEPHONE ENCOUNTER
escitalopram 20 MG Oral Tab 90 tablet 1 2/7/2025 --    Sig - Route: Take 1 tablet (20 mg total) by mouth daily. - Oral    Sent to pharmacy as: Escitalopram Oxalate 20 MG Oral Tablet (Lexapro)    E-Prescribing Status: Receipt confirmed by pharmacy (2/7/2025  5:16 PM CST)      Associated Diagnoses    Type 2 diabetes mellitus with hyperglycemia, without long-term current use of insulin ([facility])        Pharmacy    [id number] [address] AT [facility], [phone], [phone]

## 2025-06-26 NOTE — TELEPHONE ENCOUNTER
Patient is requesting a medication refill    RYBELSUS    [facility] in [facility] confirmed preferred

## 2025-06-30 RX ORDER — ORAL SEMAGLUTIDE 7 MG/1
1 TABLET ORAL DAILY
Qty: 90 TABLET | Refills: 0 | OUTPATIENT
Start: 2025-06-30

## 2025-06-30 NOTE — TELEPHONE ENCOUNTER
For replies, please route to pool: [facility] CENTRAL REFILLS    Please review: medication fails/has no protocol attached.    No future appointments with primary care medicine   Last office visit: 10/22/24

## 2025-07-01 RX ORDER — ORAL SEMAGLUTIDE 7 MG/1
7 TABLET ORAL DAILY
Qty: 90 TABLET | Refills: 0 | Status: SHIPPED | OUTPATIENT
Start: 2025-07-01

## (undated) NOTE — LETTER
Date & Time: 10/4/2023, 1:18 PM  Patient: Mau Evans  Encounter Provider(s):    TODD Edwards       To Whom It May Concern:    Mau Evans was seen and treated in our department on 9/30/2023. He can return to work with these limitations: -No heavy lifting, nothing greater than 20 pounds. No prolonged gripping, twisting etc.  Must follow-up with occupational health.   .    If you have any questions or concerns, please do not hesitate to call.        _____________________________  Physician/APC Signature

## (undated) NOTE — LETTER
Date & Time: 11/19/2023, 12:43 PM  Patient: Ahsan Delaney  Encounter Provider(s):    Brittany Coe MD       To Whom It May Concern:    Ahsan Delaney was seen and treated in our department on 11/19/2023. He can return to work.   No accommodations needed at this time patient may return to full duty status    If you have any questions or concerns, please do not hesitate to call.        _____________________________  Physician/APC Signature

## (undated) NOTE — LETTER
9/27/2019              Armando Mann        [address]          Dear Henri Lima,    Please advise your employer that you are not to use your left hand until further notice. Sincerely,      Stuart Valenzuela.  Luís, DO

## (undated) NOTE — LETTER
Date & Time: 6/7/2024, 9:55 AM  Patient: Armando Mann  Encounter Provider(s):    Joseluis Adkins MD       To Whom It May Concern:    Armando Mann was seen and treated in our department on 6/7/2024. He should not return to work until cleared by hand specialist .    If you have any questions or concerns, please do not hesitate to call.        __DR ADKINS/THADDEUS COYNE RN__________________________  Physician/APC Signature

## (undated) NOTE — LETTER
03/04/21        Armando Mann  [address]      Dear Suzy Marr,    Our records indicate that you have outstanding lab work and or testing that was ordered for you and has not yet been completed:  Orders Placed This Encounter

## (undated) NOTE — LETTER
09/11/20        Armando Mann  [address]      Dear Suzy Faster,    Our records indicate that you have outstanding lab work and or testing that was ordered for you and has not yet been completed:  Orders Placed This Encounter

## (undated) NOTE — LETTER
03/10/20      Armando Mann  [address]      Dear Becky Saurabh,    Our records indicate that you have outstanding lab work and or testing that was ordered for you and has not yet been completed:  Orders Placed This Encounter

## (undated) NOTE — LETTER
Date & Time: 7/30/2022, 2:40 PM  Patient: Jose Amezquita  Encounter Provider(s):    Jose F Glynn MD       To Whom It May Concern:    Jose Amezquita was seen and treated in our department on 7/30/2022. He can return to work without restrictions.     If you have any questions or concerns, please do not hesitate to call.        _____________________________  Physician/APC Signature

## (undated) NOTE — LETTER
19      Patient: Lacy Andre  : 1975 Visit date: 2019    Dear Lilli Madsen,      I examined your patient in consultation today. He has a nondisplaced comminuted fracture of the distal phalanx of the left thumb.   This c

## (undated) NOTE — LETTER
06/04/21        Armando Mann  [address]       Dear Mejia Driscoll,    Our records indicate that you have outstanding lab work and or testing that was ordered for you and has not yet been completed:  Orders Placed This Encounter

## (undated) NOTE — LETTER
10/31/19      Patient: Myesha Daley  : 1975 Visit date: 10/31/2019    Dear González Contreras,      I examined your patient in follow-up today. He is 5 weeks post left thumb distal phalanx comminuted fracture.     He was treated with a t

## (undated) NOTE — LETTER
12/20/21        Armando Mann  [address]      Dear Elia Scott,    Our records indicate that you have outstanding lab work and or testing that was ordered for you and has not yet been completed:  Orders Placed This Encounter

## (undated) NOTE — LETTER
06/14/21        Armando Mann  [address]      Dear Dinora Arias,    Our records indicate that you have outstanding lab work and or testing that was ordered for you and has not yet been completed:  Orders Placed This Encounter

## (undated) NOTE — LETTER
01/10/20      Armando Mann  [address]      Dear Dorcas Tyson,    Our records indicate that you have outstanding lab work and or testing that was ordered for you and has not yet been completed:  Orders Placed This Encounter

## (undated) NOTE — LETTER
09/14/21        Armando Mann  [address]       Dear Tami Delgado,    Our records indicate that you have outstanding lab work and or testing that was ordered for you and has not yet been completed:  Orders Placed This Encounter

## (undated) NOTE — LETTER
07/11/20        Armando Johnathan  [address]      Estimado Armando Sondheimer,    Nuestros registros indican que tiene análisis clínicos pendientes y/o pruebas que se ordenaron en james nombre que no se pretty completado.     Levy Cota